# Patient Record
Sex: MALE | Race: BLACK OR AFRICAN AMERICAN | NOT HISPANIC OR LATINO | Employment: FULL TIME | URBAN - METROPOLITAN AREA
[De-identification: names, ages, dates, MRNs, and addresses within clinical notes are randomized per-mention and may not be internally consistent; named-entity substitution may affect disease eponyms.]

---

## 2017-03-28 ENCOUNTER — APPOINTMENT (OUTPATIENT)
Dept: CT IMAGING | Facility: CLINIC | Age: 42
End: 2017-03-28
Attending: EMERGENCY MEDICINE

## 2017-03-28 ENCOUNTER — HOSPITAL ENCOUNTER (EMERGENCY)
Facility: CLINIC | Age: 42
Discharge: HOME | End: 2017-03-28
Attending: EMERGENCY MEDICINE | Admitting: EMERGENCY MEDICINE

## 2017-03-28 VITALS
OXYGEN SATURATION: 98 % | TEMPERATURE: 98.1 F | SYSTOLIC BLOOD PRESSURE: 175 MMHG | DIASTOLIC BLOOD PRESSURE: 127 MMHG | RESPIRATION RATE: 18 BRPM | HEART RATE: 92 BPM

## 2017-03-28 DIAGNOSIS — I15.9 SECONDARY HYPERTENSION: ICD-10-CM

## 2017-03-28 DIAGNOSIS — S13.9XXA SPRAIN OF NECK, INITIAL ENCOUNTER: ICD-10-CM

## 2017-03-28 DIAGNOSIS — S10.93XA NECK CONTUSION: ICD-10-CM

## 2017-03-28 LAB
CREAT BLD-MCNC: 1 MG/DL (ref 0.66–1.25)
GFR SERPL CREATININE-BSD FRML MDRD: 82 ML/MIN/1.7M2

## 2017-03-28 PROCEDURE — 72125 CT NECK SPINE W/O DYE: CPT

## 2017-03-28 PROCEDURE — 25000132 ZZH RX MED GY IP 250 OP 250 PS 637: Performed by: EMERGENCY MEDICINE

## 2017-03-28 PROCEDURE — 82565 ASSAY OF CREATININE: CPT

## 2017-03-28 PROCEDURE — 99285 EMERGENCY DEPT VISIT HI MDM: CPT | Mod: 25

## 2017-03-28 PROCEDURE — 70498 CT ANGIOGRAPHY NECK: CPT

## 2017-03-28 PROCEDURE — 25000128 H RX IP 250 OP 636: Performed by: EMERGENCY MEDICINE

## 2017-03-28 PROCEDURE — 25500064 ZZH RX 255 OP 636: Performed by: EMERGENCY MEDICINE

## 2017-03-28 RX ORDER — HYDROCODONE BITARTRATE AND ACETAMINOPHEN 5; 325 MG/1; MG/1
1 TABLET ORAL ONCE
Status: COMPLETED | OUTPATIENT
Start: 2017-03-28 | End: 2017-03-28

## 2017-03-28 RX ORDER — IBUPROFEN 800 MG/1
800 TABLET, FILM COATED ORAL ONCE
Status: COMPLETED | OUTPATIENT
Start: 2017-03-28 | End: 2017-03-28

## 2017-03-28 RX ORDER — HYDROCODONE BITARTRATE AND ACETAMINOPHEN 5; 325 MG/1; MG/1
1-2 TABLET ORAL EVERY 6 HOURS PRN
Qty: 8 TABLET | Refills: 0 | Status: SHIPPED | OUTPATIENT
Start: 2017-03-28 | End: 2017-04-01

## 2017-03-28 RX ORDER — IBUPROFEN 600 MG/1
600 TABLET, FILM COATED ORAL EVERY 6 HOURS PRN
Qty: 20 TABLET | Refills: 1 | Status: SHIPPED | OUTPATIENT
Start: 2017-03-28

## 2017-03-28 RX ORDER — IOPAMIDOL 755 MG/ML
500 INJECTION, SOLUTION INTRAVASCULAR ONCE
Status: COMPLETED | OUTPATIENT
Start: 2017-03-28 | End: 2017-03-28

## 2017-03-28 RX ORDER — METHOCARBAMOL 750 MG/1
750 TABLET, FILM COATED ORAL 4 TIMES DAILY PRN
Qty: 15 TABLET | Refills: 0 | Status: SHIPPED | OUTPATIENT
Start: 2017-03-28 | End: 2018-09-21

## 2017-03-28 RX ADMIN — SODIUM CHLORIDE 80 ML: 9 INJECTION, SOLUTION INTRAVENOUS at 18:55

## 2017-03-28 RX ADMIN — HYDROCODONE BITARTRATE AND ACETAMINOPHEN 1 TABLET: 5; 325 TABLET ORAL at 19:15

## 2017-03-28 RX ADMIN — IBUPROFEN 800 MG: 800 TABLET, FILM COATED ORAL at 14:13

## 2017-03-28 RX ADMIN — IOPAMIDOL 70 ML: 755 INJECTION, SOLUTION INTRAVENOUS at 18:55

## 2017-03-28 ASSESSMENT — ENCOUNTER SYMPTOMS
BACK PAIN: 1
HEADACHES: 1
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
## 2017-03-28 NOTE — ED AVS SNAPSHOT
[facility] Emergency Department    [address]    [facility] [address]    Phone:  [phone]    Fax:  [phone]                                       Jairo Whittington   MRN: [id number]    Department:  [facility] Emergency Department   Date of Visit:  3/28/2017           After Visit Summary Signature Page     I have received my discharge instructions, and my questions have been answered. I have discussed any challenges I see with this plan with the nurse or doctor.    ..........................................................................................................................................  Patient/Patient Representative Signature      ..........................................................................................................................................  Patient Representative Print Name and Relationship to Patient    ..................................................               ................................................  Date                                            Time    ..........................................................................................................................................  Reviewed by Signature/Title    ...................................................              ..............................................  Date                                                            Time
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
## 2017-03-29 NOTE — DISCHARGE INSTRUCTIONS
Discharge Instructions for High Blood Pressure (Hypertension)  You have been diagnosed with high blood pressure (also called hypertension). This means the force of blood against your artery walls is too strong. It also means your heart is working hard to move blood. High blood pressure usually has no symptoms, but over time, it can damage your heart, blood vessels, eyes, kidneys, and other organs. With help from your doctor, you can manage your blood pressure and protect your health.  Taking medications    Learn to take your own blood pressure. Keep a record of your results. Ask your doctor which readings mean that you need medical attention.    Take your blood pressure medication exactly as directed. Don t skip doses. Missing doses can cause your blood pressure to get out of control.    Avoid medications that contain heart stimulants, including over-the-counter drugs. Check for warnings about high blood pressure on the label.    Check with your doctor before taking a decongestant. Some decongestants can worsen high blood pressure.  Lifestyle changes    Maintain a healthy weight. Get help to lose any extra pounds.    Cut back on salt.    Limit canned, dried, packaged, and fast foods.    Don t add salt to your food at the table.    Season foods with herbs instead of salt when you cook.    Follow the DASH (Dietary Approaches to Stop Hypertension) eating plan. This plan recommends vegetables, fruits, whole gains, and other heart healthy foods.    Begin an exercise program. Ask your doctor how to get started. The American Heart Association recommends aerobic exercise 3 to 4 times a week for an average of 40 minutes at a time, with your doctor's approval. Simple activities like walking or gardening can help.    Break the smoking habit. Enroll in a stop-smoking program to improve your chances of success. Ask your health care provider about programs and medications to help you stop smoking.    Limit drinks that contain  caffeine (coffee, black or green tea, cola) to 2 per day.    Never take stimulants such as amphetamines or cocaine; these drugs can be deadly for someone with high blood pressure.    Control your stress. Learn stress-management techniques.    Limit alcohol to no more than 1 drink a day for women and 2 drinks a day for men.  Follow-up care  Make a follow-up appointment as directed by our staff.     When to seek medical care  Call your doctor immediately if you have any of the following:    Chest pain or shortness of breath (call 911)    Moderate to severe headache    Weakness in the muscles of your face, arms, or legs    Trouble speaking    Extreme drowsiness    Confusion    Fainting or dizziness    Pulsating or rushing sound in your ears    Unexplained nosebleed    Weakness, tingling, or numbness of your face, arms, or legs    Change in vision    Blood pressure measured at home that is greater than 180/110     0190-0693 [employer]. [address] [place] [address], [place] [address]. All rights reserved. This information is not intended as a substitute for professional medical care. Always follow your healthcare professional's instructions.

## 2017-03-31 ENCOUNTER — OFFICE VISIT (OUTPATIENT)
Dept: FAMILY MEDICINE | Facility: CLINIC | Age: 42
End: 2017-03-31
Payer: COMMERCIAL

## 2017-03-31 VITALS
OXYGEN SATURATION: 96 % | HEIGHT: 74 IN | BODY MASS INDEX: 25.6 KG/M2 | TEMPERATURE: 98.3 F | WEIGHT: 199.5 LBS | DIASTOLIC BLOOD PRESSURE: 98 MMHG | SYSTOLIC BLOOD PRESSURE: 148 MMHG | HEART RATE: 76 BPM | RESPIRATION RATE: 16 BRPM

## 2017-03-31 DIAGNOSIS — I10 ESSENTIAL HYPERTENSION: Primary | ICD-10-CM

## 2017-03-31 PROCEDURE — 99213 OFFICE O/P EST LOW 20 MIN: CPT | Performed by: PHYSICIAN ASSISTANT

## 2017-03-31 RX ORDER — CLONIDINE HYDROCHLORIDE 0.1 MG/1
0.1 TABLET ORAL 2 TIMES DAILY
Qty: 60 TABLET | Refills: 0 | Status: SHIPPED | OUTPATIENT
Start: 2017-03-31 | End: 2017-04-20 | Stop reason: SINTOL

## 2017-03-31 NOTE — PATIENT INSTRUCTIONS
1. Start the clonidine 0.1mg twice daily as instructed  2. Return for nurse visit to check your blood pressure in 2 weeks.   3. Return for visit to a provider in one month.

## 2017-03-31 NOTE — MR AVS SNAPSHOT
"              After Visit Summary   3/31/2017    Jairo Whittington    MRN: [id number]           Patient Information     Date Of Birth          1975        Visit Information        Provider Department      3/31/2017 10:30 AM Siegler, Nicole Joy, PA-C [facility]        Today's Diagnoses     Essential hypertension    -  1      Care Instructions    1. Start the clonidine 0.1mg twice daily as instructed  2. Return for nurse visit to check your blood pressure in 2 weeks.   3. Return for visit to a provider in one month.         Follow-ups after your visit        Who to contact     If you have questions or need follow up information about today's clinic visit or your schedule please contact [facility] directly at [phone].  Normal or non-critical lab and imaging results will be communicated to you by MyChart, letter or phone within 4 business days after the clinic has received the results. If you do not hear from us within 7 days, please contact the clinic through MyChart or phone. If you have a critical or abnormal lab result, we will notify you by phone as soon as possible.  Submit refill requests through [employer] or call your pharmacy and they will forward the refill request to us. Please allow 3 business days for your refill to be completed.          Additional Information About Your Visit        MyChart Information     [employer] lets you send messages to your doctor, view your test results, renew your prescriptions, schedule appointments and more. To sign up, go to [email or web address] . Click on \"Log in\" on the left side of the screen, which will take you to the Welcome page. Then click on \"Sign up Now\" on the right side of the page.     You will be asked to enter the access code listed below, as well as some personal information. Please follow the directions to create your username and password.     Your access code is: [email or web address]  Expires: 6/26/2017  " "8:06 PM     Your access code will  in 90 days. If you need help or a new code, please call your [facility] or [phone].        Care EveryWhere ID     This is your Care EveryWhere ID. This could be used by other organizations to access your [place] medical records  [id number]        Your Vitals Were     Pulse Temperature Respirations Height Pulse Oximetry BMI (Body Mass Index)    76 98.3  F (36.8  C) (Tympanic) 16 6' 2\" (1.88 m) 96% 25.61 kg/m2       Blood Pressure from Last 3 Encounters:   17 (!) 148/98   17 (!) 175/127   10/25/16 (!) 150/130    Weight from Last 3 Encounters:   17 199 lb 8 oz (90.5 kg)   10/25/16 202 lb 8 oz (91.9 kg)   10/01/15 191 lb 9 oz (86.9 kg)              Today, you had the following     No orders found for display         Today's Medication Changes          These changes are accurate as of: 3/31/17 10:49 AM.  If you have any questions, ask your nurse or doctor.               Start taking these medicines.        Dose/Directions    cloNIDine 0.1 MG tablet   Commonly known as:  CATAPRES   Used for:  Essential hypertension   Started by:  Siegler, Nicole Joy, PA-C        Dose:  0.1 mg   Take 1 tablet (0.1 mg) by mouth 2 times daily   Quantity:  60 tablet   Refills:  0            Where to get your medicines      These medications were sent to [facility] [id number] [place] [address] [address] AT [facility] [address] [id number], [facility] [address]    Hours:  24-hours Phone:  [phone]     cloNIDine 0.1 MG tablet                Primary Care Provider Office Phone #    [facility] [phone] [address] [address]        Thank you!     Thank you for choosing [facility]  for your care. Our goal is always to provide you with excellent care. Hearing back from our patients is one way we can continue to improve our services. Please take a few minutes to complete the " written survey that you may receive in the mail after your visit with us. Thank you!             Your Updated Medication List - Protect others around you: Learn how to safely use, store and throw away your medicines at www.disposemymeds.org.          This list is accurate as of: 3/31/17 10:49 AM.  Always use your most recent med list.                   Brand Name Dispense Instructions for use    cloNIDine 0.1 MG tablet    CATAPRES    60 tablet    Take 1 tablet (0.1 mg) by mouth 2 times daily       HYDROcodone-acetaminophen 5-325 MG per tablet    NORCO    8 tablet    Take 1-2 tablets by mouth every 6 hours as needed for moderate to severe pain       ibuprofen 600 MG tablet    ADVIL/MOTRIN    20 tablet    Take 1 tablet (600 mg) by mouth every 6 hours as needed for moderate pain       methocarbamol 750 MG tablet    ROBAXIN    15 tablet    Take 1 tablet (750 mg) by mouth 4 times daily as needed for muscle spasms       triamterene-hydrochlorothiazide 37.5-25 MG per capsule    DYAZIDE    30 capsule    Take 1 capsule by mouth daily       TYLENOL PO      Take by mouth as needed for mild pain or fever

## 2017-03-31 NOTE — NURSING NOTE
"Chief Complaint   Patient presents with     Hypertension     Elevated B/P        Initial BP (!) 158/100 (BP Location: Left arm, Patient Position: Chair, Cuff Size: Adult Regular)  Pulse 76  Temp 98.3  F (36.8  C) (Tympanic)  Resp 16  Ht 6' 2\" (1.88 m)  Wt 199 lb 8 oz (90.5 kg)  SpO2 96%  BMI 25.61 kg/m2 Estimated body mass index is 25.61 kg/(m^2) as calculated from the following:    Height as of this encounter: 6' 2\" (1.88 m).    Weight as of this encounter: 199 lb 8 oz (90.5 kg).  Medication Reconciliation: complete     Christina Carlos LPN  "

## 2017-03-31 NOTE — PROGRESS NOTES
SUBJECTIVE:                                                    Jairo Whittington is a 41 year old male who presents to clinic today for the following health issues:    Hypertension Follow-up      Outpatient blood pressures are being checked at store.  Results are Elevated.    Low Salt Diet: no added salt       Amount of exercise or physical activity: 4-5 days/week for an average of 15-30 minutes    Problems taking medications regularly: No    Medication side effects: none    Diet: low salt    He has been following an appropriate diet but has had persisting elevated blood pressure results over the past few months. He has been taking his dyazide as directed. He has tried amlodipine and had headaches, has taken clonidine without side effects.     Problem list and histories reviewed & adjusted, as indicated.  Additional history: as documented    Patient Active Problem List   Diagnosis     GERD (gastroesophageal reflux disease)     HTN (hypertension)     Hyperlipidemia with target LDL less than 130     Folliculitis     Past Surgical History:   Procedure Laterality Date     C NONSPECIFIC PROCEDURE      left foot surgery       Social History   Substance Use Topics     Smoking status: Never Smoker     Smokeless tobacco: Never Used     Alcohol use No     Family History   Problem Relation Age of Onset     Family History Negative Mother      Family History Negative Father          Current Outpatient Prescriptions   Medication Sig Dispense Refill     cloNIDine (CATAPRES) 0.1 MG tablet Take 1 tablet (0.1 mg) by mouth 2 times daily 60 tablet 0     HYDROcodone-acetaminophen (NORCO) 5-325 MG per tablet Take 1-2 tablets by mouth every 6 hours as needed for moderate to severe pain 8 tablet 0     methocarbamol (ROBAXIN) 750 MG tablet Take 1 tablet (750 mg) by mouth 4 times daily as needed for muscle spasms 15 tablet 0     ibuprofen (ADVIL/MOTRIN) 600 MG tablet Take 1 tablet (600 mg) by mouth every 6 hours as needed for moderate pain 20  "tablet 1     triamterene-hydrochlorothiazide (DYAZIDE) 37.5-25 MG per capsule Take 1 capsule by mouth daily 30 capsule 0     Acetaminophen (TYLENOL PO) Take by mouth as needed for mild pain or fever       [DISCONTINUED] cloNIDine (CATAPRES) 0.1 MG tablet 1 po in clinic (Patient not taking: Reported on 3/31/2017) 1 tablet 0       Reviewed and updated as needed this visit by clinical staff  Tobacco  Allergies  Meds  Problems  Med Hx  Surg Hx  Fam Hx  Soc Hx        Reviewed and updated as needed this visit by Provider  Allergies  Meds  Problems         ROS:  Constitutional, HEENT, cardiovascular, pulmonary, gi and gu systems are negative, except as otherwise noted.    OBJECTIVE:                                                    BP (!) 148/98  Pulse 76  Temp 98.3  F (36.8  C) (Tympanic)  Resp 16  Ht 6' 2\" (1.88 m)  Wt 199 lb 8 oz (90.5 kg)  SpO2 96%  BMI 25.61 kg/m2  Body mass index is 25.61 kg/(m^2).  GENERAL: healthy, alert and no distress  NECK: no adenopathy, no asymmetry, masses, or scars and thyroid normal to palpation  RESP: lungs clear to auscultation - no rales, rhonchi or wheezes  CV: regular rate and rhythm, normal S1 S2, no S3 or S4, no murmur, click or rub, no peripheral edema and peripheral pulses strong  SKIN: no suspicious lesions or rashes  NEURO: Normal strength and tone, mentation intact and speech normal  PSYCH: mentation appears normal, affect normal/bright    Diagnostic Test Results:  none      ASSESSMENT/PLAN:                                                        ICD-10-CM    1. Essential hypertension I10 cloNIDine (CATAPRES) 0.1 MG tablet     We will start him on clonidine today. We will watch closely for low pulse and blood pressure dropping too low with this medication. We decided to use this as he has tolerated it in the past and he was not interested in increasing his dyazide and have increased potential for hyperkalemia. We discussed potential side effects to clonidine and " he was willing to accept that risk with this medication. He agrees to the plan below for close monitoring and we will consider a beta blocker such as metoprolol should he not tolerate this well, long term.     Patient Instructions   1. Start the clonidine 0.1mg twice daily as instructed  2. Return for nurse visit to check your blood pressure in 2 weeks.   3. Return for visit to a provider in one month.       Nicole Joy Siegler, PA-C  [facility]

## 2017-05-03 ENCOUNTER — OFFICE VISIT (OUTPATIENT)
Dept: INTERNAL MEDICINE | Facility: CLINIC | Age: 42
End: 2017-05-03
Payer: COMMERCIAL

## 2017-05-03 VITALS
HEART RATE: 75 BPM | DIASTOLIC BLOOD PRESSURE: 118 MMHG | OXYGEN SATURATION: 98 % | SYSTOLIC BLOOD PRESSURE: 160 MMHG | WEIGHT: 198 LBS | BODY MASS INDEX: 25.42 KG/M2 | TEMPERATURE: 98.8 F

## 2017-05-03 DIAGNOSIS — E78.5 HYPERLIPIDEMIA WITH TARGET LDL LESS THAN 130: ICD-10-CM

## 2017-05-03 DIAGNOSIS — I10 BENIGN ESSENTIAL HYPERTENSION: Primary | ICD-10-CM

## 2017-05-03 PROCEDURE — 99214 OFFICE O/P EST MOD 30 MIN: CPT | Performed by: INTERNAL MEDICINE

## 2017-05-03 RX ORDER — LISINOPRIL AND HYDROCHLOROTHIAZIDE 20; 25 MG/1; MG/1
TABLET ORAL
Qty: 30 TABLET | Refills: 0 | Status: SHIPPED | OUTPATIENT
Start: 2017-05-03

## 2017-05-03 NOTE — MR AVS SNAPSHOT
"              After Visit Summary   5/3/2017    Jairo Whittington    MRN: [id number]           Patient Information     Date Of Birth          1975        Visit Information        Provider Department      5/3/2017 8:40 AM Don Leal MD [facility]        Today's Diagnoses     Benign essential hypertension    -  1    Hyperlipidemia with target LDL less than 130          Care Instructions    *  Blood pressure elevated    *  Start Medication to help lower blood pressure.  You have taken these medication before in 4014-3023.     *  Start Lisinopril HCTZ 20/25.  Take 1/2 tablet per day in the morning for one week, then 1 tablet per day in the morning.     *  Main side effects are throat irritation, new coughing (typically dry cough).  Very rare reaction can include swelling of the face, lipids, tongue.  If you experience any concerning side effects, then stop the medication immediately and contact us.     *  Return to see me in approximately 1 month regardless of how you feel, sooner if needed.  Please get fasting labs done in the [facility] lab 1-2 days before this appointment.  Eat nothing for at least 8 hours prior to having these labs drawn.  Call [phone] to schedule both appointments.       5 GOALS TO PREVENT VASCULAR DISEASE:     1.  Aggressive blood pressure control, under 130/80 ideally.  Using medications if needed.    Your blood pressure is NOT under good control    BP Readings from Last 4 Encounters:   05/03/17 (!) 160/118   03/31/17 (!) 148/98   03/28/17 (!) 175/127   10/25/16 (!) 150/130       2.  Aggressive LDL cholesterol (\"bad cholesterol\") lowering as indicated.    Your goal is an LDL under 130 for sure, preferably under 100.  (If you have diabetes or previous vascular disease, the the LDL goals would be under 100 for sure, preferably under 70.)    New guidelines identify four high-risk groups who could benefit from statins:   *people with pre-existing heart disease, " such as those who have had a heart attack;   *people ages 40 to 75 who have diabetes of any type  *patients ages 40 to 75 with at least a 7.5% risk of developing cardiovascular disease over the next decade, according to a formula described in the guidelines  *patients with the sort of super-high cholesterol that sometimes runs in families, as evidenced by an LDL of 190 milligrams per deciliter or higher    Your cholesterol levels are mildly elevated, we will recheck them again.     Recent Labs   Lab Test  09/19/12   1337  06/14/11   1144   CHOL  222*  221*   HDL  43  44   LDL  150*  155*   TRIG  145  108   CHOLHDLRATIO  5.2*  5.0       3.  Aggressive diabetic prevention, screening and/or management.      You do not have diabetes as of the most recent blood tests.     4.  No smoking    5.  Consider taking low dose aspirin (81 mg) tablet once per day OVER AGE 50            Follow-ups after your visit        Follow-up notes from your care team     Return in about 4 weeks (around 5/31/2017) for BP Recheck.      Future tests that were ordered for you today     Open Future Orders        Priority Expected Expires Ordered    CBC with platelets and differential Routine 6/3/2017 7/3/2017 5/3/2017    Basic metabolic panel Routine 6/3/2017 7/3/2017 5/3/2017    AST Routine 6/3/2017 7/3/2017 5/3/2017    ALT Routine 6/3/2017 7/3/2017 5/3/2017    Lipid Profile with reflex to direct LDL Routine 6/3/2017 7/3/2017 5/3/2017            Who to contact     If you have questions or need follow up information about today's clinic visit or your schedule please contact [facility] directly at [phone].  Normal or non-critical lab and imaging results will be communicated to you by MyChart, letter or phone within 4 business days after the clinic has received the results. If you do not hear from us within 7 days, please contact the clinic through MyChart or phone. If you have a critical or abnormal lab result, we will  "notify you by phone as soon as possible.  Submit refill requests through [employer] or call your pharmacy and they will forward the refill request to us. Please allow 3 business days for your refill to be completed.          Additional Information About Your Visit        [employer] Information     [employer] lets you send messages to your doctor, view your test results, renew your prescriptions, schedule appointments and more. To sign up, go to [email or web address] . Click on \"Log in\" on the left side of the screen, which will take you to the Welcome page. Then click on \"Sign up Now\" on the right side of the page.     You will be asked to enter the access code listed below, as well as some personal information. Please follow the directions to create your username and password.     Your access code is: [email or web address]  Expires: 2017  8:06 PM     Your access code will  in 90 days. If you need help or a new code, please call your [place] clinic or [phone].        Care EveryWhere ID     This is your Care EveryWhere ID. This could be used by other organizations to access your [place] medical records  [id number]        Your Vitals Were     Pulse Temperature Pulse Oximetry BMI (Body Mass Index)          75 98.8  F (37.1  C) (Oral) 98% 25.42 kg/m2         Blood Pressure from Last 3 Encounters:   17 (!) 160/118   17 (!) 148/98   17 (!) 175/127    Weight from Last 3 Encounters:   17 198 lb (89.8 kg)   17 199 lb 8 oz (90.5 kg)   10/25/16 202 lb 8 oz (91.9 kg)                 Today's Medication Changes          These changes are accurate as of: 5/3/17  9:34 AM.  If you have any questions, ask your nurse or doctor.               Start taking these medicines.        Dose/Directions    lisinopril-hydrochlorothiazide 20-25 MG per tablet   Commonly known as:  PRINZIDE/ZESTORETIC   Used for:  Benign essential hypertension   Started by:  Don Leal MD        1/2 TABLET PER DAY IN " THE MORNING FOR ONE WEEK, THE 1 TABLET PER DAY IN THE MORNING   Quantity:  30 tablet   Refills:  0         Stop taking these medicines if you haven't already. Please contact your care team if you have questions.     cloNIDine 0.1 MG tablet   Commonly known as:  CATAPRES   Stopped by:  Don Leal MD                Where to get your medicines      These medications were sent to [address] [address]     Phone:  [phone]     lisinopril-hydrochlorothiazide 20-25 MG per tablet                Primary Care Provider Office Phone #    [facility] [phone] [address] [address]        Thank you!     Thank you for choosing [facility]  for your care. Our goal is always to provide you with excellent care. Hearing back from our patients is one way we can continue to improve our services. Please take a few minutes to complete the written survey that you may receive in the mail after your visit with us. Thank you!             Your Updated Medication List - Protect others around you: Learn how to safely use, store and throw away your medicines at www.disposemymeds.org.          This list is accurate as of: 5/3/17  9:34 AM.  Always use your most recent med list.                   Brand Name Dispense Instructions for use    ibuprofen 600 MG tablet    ADVIL/MOTRIN    20 tablet    Take 1 tablet (600 mg) by mouth every 6 hours as needed for moderate pain       lisinopril-hydrochlorothiazide 20-25 MG per tablet    PRINZIDE/ZESTORETIC    30 tablet    1/2 TABLET PER DAY IN THE MORNING FOR ONE WEEK, THE 1 TABLET PER DAY IN THE MORNING       methocarbamol 750 MG tablet    ROBAXIN    15 tablet    Take 1 tablet (750 mg) by mouth 4 times daily as needed for muscle spasms       triamterene-hydrochlorothiazide 37.5-25 MG per capsule    DYAZIDE    30 capsule    Take 1 capsule by mouth  daily       TYLENOL PO      Take by mouth as needed for mild pain or fever Reported on 5/3/2017

## 2017-05-03 NOTE — PROGRESS NOTES
SUBJECTIVE:                                                    Jairo Whittington is a 41 year old male who presents to clinic today for the following health issues:      Hypertension Follow-up      Outpatient blood pressures are being checked at store.  Results are 140-150/.     Pt states he drinks 4-5 cups of coffee/tea throughout the day. He stopped clonidine after one week (approx 4/7/17) due to headaches and dry mouth side effect. He has been out of dyazide for 1 week.    Low Salt Diet: no added salt       Amount of exercise or physical activity: 6-7 days/week for an average of 45-60 minutes    Problems taking medications regularly: Yes,  side effects    Medication side effects: headaches and dry mouth from clonidine    Diet: low salt          Problem list and histories reviewed & adjusted, as indicated.  Additional history: as documented        Reviewed and updated as needed this visit by clinical staff  Tobacco  Allergies       Reviewed and updated as needed this visit by Provider             Past Medical History:  ---------------------------  Past Medical History:   Diagnosis Date     Dermatitis      Folliculitis  June 2011    scalp     HTN (hypertension)      Hyperlipidemia LDL goal < 130        Past Surgical History:  ---------------------------  Past Surgical History:   Procedure Laterality Date     C NONSPECIFIC PROCEDURE      left foot surgery       Current Medications:  ---------------------------  Current Outpatient Prescriptions   Medication Sig Dispense Refill     lisinopril-hydrochlorothiazide (PRINZIDE/ZESTORETIC) 20-25 MG per tablet 1/2 TABLET PER DAY IN THE MORNING FOR ONE WEEK, THE 1 TABLET PER DAY IN THE MORNING 30 tablet 0     methocarbamol (ROBAXIN) 750 MG tablet Take 1 tablet (750 mg) by mouth 4 times daily as needed for muscle spasms (Patient not taking: Reported on 5/3/2017) 15 tablet 0     ibuprofen (ADVIL/MOTRIN) 600 MG tablet Take 1 tablet (600 mg) by mouth every 6 hours as needed  for moderate pain (Patient not taking: Reported on 5/3/2017) 20 tablet 1     Acetaminophen (TYLENOL PO) Take by mouth as needed for mild pain or fever Reported on 5/3/2017       triamterene-hydrochlorothiazide (DYAZIDE) 37.5-25 MG per capsule Take 1 capsule by mouth daily (Patient not taking: Reported on 5/3/2017) 30 capsule 0       Allergies:  -------------  Allergies   Allergen Reactions     Amlodipine Besylate      headaches       Social History:  -------------------  Social History     Social History     Marital status:      Spouse name: N/A     Number of children: 2     Years of education: N/A     Occupational History      [email or web address]     Social History Main Topics     Smoking status: Never Smoker     Smokeless tobacco: Never Used     Alcohol use No     Drug use: No     Sexual activity: Yes     Partners: Female     Other Topics Concern     Not on file     Social History Narrative       Family Medical History:  ------------------------------  Family History   Problem Relation Age of Onset     Family History Negative Mother      Family History Negative Father          ROS:  REVIEW OF SYSTEMS:    RESP: negative for cough, dyspnea, wheezing, hemoptysis  CV: negative for chest pain, palpitations, PND, PEREZ, orthopnea; reports no changes in their ability to perform physical activity (from cardiovascular standpoint)  GI: negative for dysphagia, N/V, pain, melena, diarrhea and constipation  NEURO: negative for numbness/tingling, paralysis, incoordination, or focal weakness     OBJECTIVE:                                                    BP (!) 160/118  Pulse 75  Temp 98.8  F (37.1  C) (Oral)  Wt 198 lb (89.8 kg)  SpO2 98%  BMI 25.42 kg/m2     GENERAL alert and no distress  EYES:  Normal sclera,conjunctiva, EOMI  HENT: oral and posterior pharynx without lesions or erythema, facies symmetric  NECK: Neck supple. No LAD, without thyroidmegaly or JVD., Carotids without bruits.  RESP:  Clear to ausculation bilaterally without wheezes or crackles. Normal BS in all fields.  CV: RRR normal S1S2 without murmurs, rubs or gallops. PMI normal  LYMPH: no cervical lymph adenopathy appreciated  MS: extremities- no gross deformities of the visible extremities noted, no edema  PSYCH: Alert and oriented times 3; speech- coherent  SKIN:  No obvious significant skin lesions on visible portions of face          ASSESSMENT/PLAN:                                                      (I10) Benign essential hypertension  (primary encounter diagnosis)  Comment: needs treatment  Discussed hypertension in detail including JNC VIII guidelines for blood pressure goals.  Discussed indication for treatment and treatment options.  Discussed the importance for aggressive management of HTN to prevent vascular complications later.  Recommended lower fat, lower carbohydrate, and lower sodium (<2000 mg)diet.  Discussed required intervals for follow up on HTN, lab studies, and the need to aggresive management of other cardiac disease risk factors.  Recommened pt. follow their blood pressures outside the clinic to ensure that BPs are remaining within guidelines, and to contact me if the readings are not within guidelines so we can adjust treatment as needed.   Plan: lisinopril-hydrochlorothiazide         (PRINZIDE/ZESTORETIC) 20-25 MG per tablet, CBC         with platelets and differential, Basic         metabolic panel            (E78.5) Hyperlipidemia with target LDL less than 130  Comment: Discussed current lipid results, previous results (if available) current guidelines (NCEP) for treatment and goals for lipids.  Discussed lifestyle modification, dietary changes (low fat, low simple carb) and regular aerobic exercise.  Discussed the link between dysmetabolic syndrome and impaired glucose tolerance seen in certain patterns of lipids.  Briefly discussed medication used for lipid lowering, including the statins are their possible  side effects of myalgias, rhabdomyolysis, and liver toxicity.   Plan: AST, ALT, Lipid Profile with reflex to direct         LDL            *  Blood pressure elevated    *  Start Medication to help lower blood pressure.  You have taken these medication before in 4398-4373.     *  Start Lisinopril HCTZ 20/25.  Take 1/2 tablet per day in the morning for one week, then 1 tablet per day in the morning.     *  Main side effects are throat irritation, new coughing (typically dry cough).  Very rare reaction can include swelling of the face, lipids, tongue.  If you experience any concerning side effects, then stop the medication immediately and contact us.     *  Return to see me in approximately 1 month regardless of how you feel, sooner if needed.  Please get fasting labs done in the [place] lab 1-2 days before this appointment.  Eat nothing for at least 8 hours prior to having these labs drawn.  Call [phone] to schedule both appointments.         See Patient Instructions    ALONZO JONES M.D., MD  [facility]

## 2017-05-03 NOTE — NURSING NOTE
"Chief Complaint   Patient presents with     Hypertension     med recheck - side effect from clonidine       Initial BP (!) 158/116  Pulse 75  Temp 98.8  F (37.1  C) (Oral)  Wt 198 lb (89.8 kg)  SpO2 98%  BMI 25.42 kg/m2 Estimated body mass index is 25.42 kg/(m^2) as calculated from the following:    Height as of 3/31/17: 6' 2\" (1.88 m).    Weight as of this encounter: 198 lb (89.8 kg).  Medication Reconciliation: complete   Ekta German CMA      "

## 2017-05-03 NOTE — PATIENT INSTRUCTIONS
"*  Blood pressure elevated    *  Start Medication to help lower blood pressure.  You have taken these medication before in 9941-5296.     *  Start Lisinopril HCTZ 20/25.  Take 1/2 tablet per day in the morning for one week, then 1 tablet per day in the morning.     *  Main side effects are throat irritation, new coughing (typically dry cough).  Very rare reaction can include swelling of the face, lipids, tongue.  If you experience any concerning side effects, then stop the medication immediately and contact us.     *  Return to see me in approximately 1 month regardless of how you feel, sooner if needed.  Please get fasting labs done in the [facility] lab 1-2 days before this appointment.  Eat nothing for at least 8 hours prior to having these labs drawn.  Call [phone] to schedule both appointments.       5 GOALS TO PREVENT VASCULAR DISEASE:     1.  Aggressive blood pressure control, under 130/80 ideally.  Using medications if needed.    Your blood pressure is NOT under good control    BP Readings from Last 4 Encounters:   05/03/17 (!) 160/118   03/31/17 (!) 148/98   03/28/17 (!) 175/127   10/25/16 (!) 150/130       2.  Aggressive LDL cholesterol (\"bad cholesterol\") lowering as indicated.    Your goal is an LDL under 130 for sure, preferably under 100.  (If you have diabetes or previous vascular disease, the the LDL goals would be under 100 for sure, preferably under 70.)    New guidelines identify four high-risk groups who could benefit from statins:   *people with pre-existing heart disease, such as those who have had a heart attack;   *people ages 40 to 75 who have diabetes of any type  *patients ages 40 to 75 with at least a 7.5% risk of developing cardiovascular disease over the next decade, according to a formula described in the guidelines  *patients with the sort of super-high cholesterol that sometimes runs in families, as evidenced by an LDL of 190 milligrams per deciliter or higher    Your cholesterol " levels are mildly elevated, we will recheck them again.     Recent Labs   Lab Test  09/19/12   1337  06/14/11   1144   CHOL  222*  221*   HDL  43  44   LDL  150*  155*   TRIG  145  108   CHOLHDLRATIO  5.2*  5.0       3.  Aggressive diabetic prevention, screening and/or management.      You do not have diabetes as of the most recent blood tests.     4.  No smoking    5.  Consider taking low dose aspirin (81 mg) tablet once per day OVER AGE 50

## 2018-09-14 ENCOUNTER — HOSPITAL ENCOUNTER (EMERGENCY)
Facility: CLINIC | Age: 43
Discharge: HOME | End: 2018-09-14
Attending: NURSE PRACTITIONER | Admitting: NURSE PRACTITIONER
Payer: COMMERCIAL

## 2018-09-14 VITALS
SYSTOLIC BLOOD PRESSURE: 175 MMHG | OXYGEN SATURATION: 98 % | TEMPERATURE: 98.5 F | DIASTOLIC BLOOD PRESSURE: 129 MMHG | RESPIRATION RATE: 18 BRPM | HEART RATE: 67 BPM

## 2018-09-14 DIAGNOSIS — R03.0 ELEVATED BLOOD PRESSURE READING WITHOUT DIAGNOSIS OF HYPERTENSION: ICD-10-CM

## 2018-09-14 DIAGNOSIS — M54.2 NECK PAIN: ICD-10-CM

## 2018-09-14 DIAGNOSIS — M25.572 PAIN IN JOINT INVOLVING ANKLE AND FOOT, LEFT: ICD-10-CM

## 2018-09-14 PROCEDURE — 99282 EMERGENCY DEPT VISIT SF MDM: CPT

## 2018-09-14 ASSESSMENT — ENCOUNTER SYMPTOMS
NECK PAIN: 1
ARTHRALGIAS: 1
WEAKNESS: 0
SHORTNESS OF BREATH: 0
FEVER: 0
NUMBNESS: 0

## 2018-09-14 NOTE — ED AVS SNAPSHOT
[facility] Emergency Department    [address]    [facility] [address]    Phone:  [phone]    Fax:  [phone]                                       Jairo Whittington   MRN: [id number]    Department:  [facility] Emergency Department   Date of Visit:  9/14/2018           After Visit Summary Signature Page     I have received my discharge instructions, and my questions have been answered. I have discussed any challenges I see with this plan with the nurse or doctor.    ..........................................................................................................................................  Patient/Patient Representative Signature      ..........................................................................................................................................  Patient Representative Print Name and Relationship to Patient    ..................................................               ................................................  Date                                   Time    ..........................................................................................................................................  Reviewed by Signature/Title    ...................................................              ..............................................  Date                                               Time          22EPIC Rev 08/18

## 2018-09-14 NOTE — ED AVS SNAPSHOT
[facility] Emergency Department    [address]    [facility] [address]    Phone:  [phone]    Fax:  [phone]                                       Jairo Whittington   MRN: [id number]    Department:  [facility] Emergency Department   Date of Visit:  9/14/2018           Patient Information     Date Of Birth          1975        Your diagnoses for this visit were:     Neck pain     Pain in joint involving ankle and foot, left     Elevated blood pressure reading without diagnosis of hypertension        You were seen by Tashi Rosado APRN CNP.      Follow-up Information     Call Clinic, [facility].    Why:  on Monday to schedule appointment for follow up    Contact information:    [address] [address]  [phone]          Discharge Instructions       Ibuprofen or Naproxen and/or Tylenol scheduled for the next 3-5 days. 600 mg (three tabs) ibuprofen, 440 mg (two tabs) Naproxen, 650-1000 mg of Tylenol.  Ibuprofen and Tylenol are every 6 hours Naproxen is 2 times daily. Follow directions.   Ice or heat to area.  I recommend ice in the first 24-48 hours.    Asymptomatic elevated BP today. Keep log of BP 2-3 times daily for the next week and take those readings to your primary MD.        Discharge Instructions  Hypertension - High Blood Pressure    During you visit to the Emergency Department, your blood pressure was higher than the recommended blood pressure.  This may be related to stress, pain, medication or other temporary conditions. In these cases, your blood pressure may return to normal on its own. If you have a history of high blood pressure, you may need to have your provider adjust your medications. Sometimes, your high measurement here may indicate that you have developed high blood pressure that will stay high unless it is treated. As a general rule, high blood pressure causes problems over years rather than days, weeks, or months.  So, while it is important to treat blood pressure, it is rarely important to treat blood pressure immediately. Occasionally we will begin a medication in the Emergency Department; more often we will recommend close follow-up for medications with a primary doctor/clinic.    Generally, every Emergency Department visit should have a follow-up clinic visit with either a primary or a specialty clinic/provider. Please follow-up as instructed by your emergency provider today.    Return to the Emergency Department if you start to have:    A severe headache.    Chest pain.    Shortness of breath.    Weakness or numbness that affects one part of the body.    Confusion.    Vision changes.    Significant swelling of legs and/or eyes.    A reaction to any medication started in the Emergency Department.    What can I do to help myself?    Avoid alcohol.    Take any blood pressure medicine that you are prescribed.    Get a good night s sleep.    Lower your salt intake.    Exercise.    Lose weight.    Manage stress.    See your doctor regularly    If blood pressure medication was started in the Emergency Department:    The medicine may not have an immediate effect. The body and brain determine what blood pressure you have. The medicine s job is to retrain the body s  thermostat  to a lower blood pressure.    You will need to follow up with your provider to see how this medicine is working for you.  If you were given a prescription for medicine here today, be sure to read all of the information (including the package insert) that comes with your prescription.  This will include important information about the medicine, its side effects, and any warnings that you need to know about.  The pharmacist who fills the prescription can provide more information and answer questions you may have about the medicine.  If you have questions or concerns that the pharmacist cannot address, please call or return to the Emergency Department.    Remember that you can always come back to the Emergency Department if you are not able to see your regular provider in the amount of time listed above, if you get any new symptoms, or if there is anything that worries you.    Discharge References/Attachments     R.I.C.E. (ENGLISH)    STRAINS AND SPRAINS, SELF-CARE FOR (ENGLISH)    NECK PAIN (ENGLISH)    MUSCLE STRAIN, EXTREMITY (ENGLISH)      24 Hour Appointment Hotline       To make an appointment at any [facility], call [phone] ([phone]). If you don't have a family doctor or clinic, we will help you find one. [place] clinics are conveniently located to serve the needs of you and your family.             Review of your medicines      Our records show that you are taking the medicines listed below. If these are incorrect, please call your family doctor or clinic.        Dose / Directions Last dose taken    ibuprofen 600 MG tablet   Commonly known as:  ADVIL/MOTRIN   Dose:  600 mg   Quantity:  20 tablet        Take 1 tablet (600 mg) by mouth every 6 hours as needed for moderate pain   Refills:  1        lisinopril-hydrochlorothiazide 20-25 MG per tablet   Commonly known as:  PRINZIDE/ZESTORETIC   Quantity:  30 tablet        1/2 TABLET PER DAY IN THE MORNING FOR ONE WEEK, THE 1 TABLET PER DAY IN THE MORNING   Refills:  0        methocarbamol 750 MG tablet   Commonly known as:  ROBAXIN   Dose:  750 mg   Quantity:  15 tablet        Take 1 tablet (750 mg) by mouth 4 times daily as needed for muscle spasms   Refills:  0        triamterene-hydrochlorothiazide 37.5-25 MG per capsule   Commonly known as:  DYAZIDE   Dose:  1 capsule   Quantity:  30 capsule        Take 1 capsule by mouth daily   Refills:  0        TYLENOL PO        Take by mouth as needed for mild pain or fever Reported on 5/3/2017   Refills:  0                Orders Needing Specimen Collection     None      Pending Results     No orders found from 9/12/2018 to 9/15/2018.             Pending Culture Results     No orders found from 9/12/2018 to 9/15/2018.            Pending Results Instructions     If you had any lab results that were not finalized at the time of your Discharge, you can call the ED Lab Result RN at [phone]. You will be contacted by this team for any positive Lab results or changes in treatment. The nurses are available 7 days a week from 10A to 6:30P.  You can leave a message 24 hours per day and they will return your call.        Test Results From Your Hospital Stay               Clinical Quality Measure: Blood Pressure Screening     Your blood pressure was checked while you were in the emergency department today. The last reading we obtained was  BP: (!) 175/129 . Please read the guidelines below about what these numbers mean and what you should do about them.  If your systolic blood pressure (the top number) is less than 120 and your diastolic blood pressure (the bottom number) is less than 80, then your blood pressure is normal. There is nothing more that you need to do about it.  If your systolic blood pressure (the top number) is 120-139 or your diastolic blood pressure (the bottom number) is 80-89, your blood pressure may be higher than it should be. You should have your blood pressure rechecked within a year by a primary care provider.  If your systolic blood pressure (the top number) is 140 or greater or your diastolic blood pressure (the bottom number) is 90 or greater, you may have high blood pressure. High blood pressure is treatable, but if left untreated over time it can put you at risk for heart attack, stroke, or kidney failure. You should have your blood pressure rechecked by a primary care provider within the next 4 weeks.  If your provider in the emergency department today gave you specific instructions to follow-up with your doctor or provider even sooner than that, you should follow that instruction and not wait for up to 4 weeks for your follow-up  "visit.        Thank you for choosing [facility]       Thank you for choosing [facility] for your care. Our goal is always to provide you with excellent care. Hearing back from our patients is one way we can continue to improve our services. Please take a few minutes to complete the written survey that you may receive in the mail after you visit with us. Thank you!        [employer] Information     [employer] lets you send messages to your doctor, view your test results, renew your prescriptions, schedule appointments and more. To sign up, go to [email or web address] . Click on \"Log in\" on the left side of the screen, which will take you to the Welcome page. Then click on \"Sign up Now\" on the right side of the page.     You will be asked to enter the access code listed below, as well as some personal information. Please follow the directions to create your username and password.     Your access code is: [email or web address]  Expires: 2018 11:15 PM     Your access code will  in 90 days. If you need help or a new code, please call your [facility] clinic or [phone].        Care EveryWhere ID     This is your Care EveryWhere ID. This could be used by other organizations to access your [facility] medical records  [id number]        Equal Access to Services     NICOLE FU : Hadbaylee kowalskio Sovinnie, waaxda luqadaha, qaybta kaalmada adeegyada, noam bhagat. [facility] [phone].    ATENCIÓN: Si habla español, tiene a freitas disposición servicios gratuitos de asistencia lingüística. Llame al [phone].    We comply with applicable federal civil rights laws and [place] laws. We do not discriminate on the basis of race, color, national origin, age, disability, sex, sexual orientation, or gender identity.            After Visit Summary       This is your record. Keep this with you and show to your community pharmacist(s) and doctor(s) at your next visit.                  "

## 2018-09-15 ENCOUNTER — TRANSFERRED RECORDS (OUTPATIENT)
Dept: HEALTH INFORMATION MANAGEMENT | Facility: CLINIC | Age: 43
End: 2018-09-15

## 2018-09-15 ASSESSMENT — ENCOUNTER SYMPTOMS: HEADACHES: 0

## 2018-09-15 NOTE — DISCHARGE INSTRUCTIONS
Ibuprofen or Naproxen and/or Tylenol scheduled for the next 3-5 days. 600 mg (three tabs) ibuprofen, 440 mg (two tabs) Naproxen, 650-1000 mg of Tylenol.  Ibuprofen and Tylenol are every 6 hours Naproxen is 2 times daily. Follow directions.   Ice or heat to area.  I recommend ice in the first 24-48 hours.    Asymptomatic elevated BP today. Keep log of BP 2-3 times daily for the next week and take those readings to your primary MD.        Discharge Instructions  Hypertension - High Blood Pressure    During you visit to the Emergency Department, your blood pressure was higher than the recommended blood pressure.  This may be related to stress, pain, medication or other temporary conditions. In these cases, your blood pressure may return to normal on its own. If you have a history of high blood pressure, you may need to have your provider adjust your medications. Sometimes, your high measurement here may indicate that you have developed high blood pressure that will stay high unless it is treated. As a general rule, high blood pressure causes problems over years rather than days, weeks, or months. So, while it is important to treat blood pressure, it is rarely important to treat blood pressure immediately. Occasionally we will begin a medication in the Emergency Department; more often we will recommend close follow-up for medications with a primary doctor/clinic.    Generally, every Emergency Department visit should have a follow-up clinic visit with either a primary or a specialty clinic/provider. Please follow-up as instructed by your emergency provider today.    Return to the Emergency Department if you start to have:    A severe headache.    Chest pain.    Shortness of breath.    Weakness or numbness that affects one part of the body.    Confusion.    Vision changes.    Significant swelling of legs and/or eyes.    A reaction to any medication started in the Emergency Department.    What can I do to help  myself?    Avoid alcohol.    Take any blood pressure medicine that you are prescribed.    Get a good night s sleep.    Lower your salt intake.    Exercise.    Lose weight.    Manage stress.    See your doctor regularly    If blood pressure medication was started in the Emergency Department:    The medicine may not have an immediate effect. The body and brain determine what blood pressure you have. The medicine s job is to retrain the body s  thermostat  to a lower blood pressure.    You will need to follow up with your provider to see how this medicine is working for you.  If you were given a prescription for medicine here today, be sure to read all of the information (including the package insert) that comes with your prescription.  This will include important information about the medicine, its side effects, and any warnings that you need to know about.  The pharmacist who fills the prescription can provide more information and answer questions you may have about the medicine.  If you have questions or concerns that the pharmacist cannot address, please call or return to the Emergency Department.   Remember that you can always come back to the Emergency Department if you are not able to see your regular provider in the amount of time listed above, if you get any new symptoms, or if there is anything that worries you.

## 2018-09-15 NOTE — ED PROVIDER NOTES
History     Chief Complaint:  Neck & ankle pain    HPI   Jairo Whittington is a 43 year old male who presents to the ED with neck pain. The patient states that today at 1800 he began having atraumatic left-sided neck pain. He denies any neck injuries or falls.     He also states that for the past two to three days he has had left lateral ankle pain. He denies any trips or falls. He has not taken anything for the pain.     The patient also notes that he has been having daily elevation in his blood pressure. He stated that he used to take medication for it, but is no longer on this.    Allergies:  Amlodipine Besylate     Medications:    Tylenol  Advil/Motrin  Prinzide/Zestoretic  Robaxin  Dyazide     Past Medical History:    Dermatitis  Folliculitis  GERD  Hypertension  Hyperlipidemia    Past Surgical History:    Foot Surgery    Family History:    History reviewed. No pertinent family history.     Social History:  Smoking Status: Never Smoker  Alcohol Use: No  Patient presents Alone  Marital Status:       Review of Systems   Constitutional: Negative for fever.   Eyes: Negative for visual disturbance.   Respiratory: Negative for shortness of breath.    Cardiovascular: Negative for chest pain.   Musculoskeletal: Positive for arthralgias and neck pain.   Neurological: Negative for weakness, numbness and headaches.   All other systems reviewed and are negative.    Physical Exam   First Vitals:  BP: (!) 197/134  Pulse: 67  Temp: 98.5  F (36.9  C)  Resp: 18  SpO2: 98 %      Physical Exam  General: Alert, No obvious discomfort, well kept   HENT:  Normal voice, No lymphadenopathy  Eyes:  The pupils are equal, round, and reactive to light, Conjunctiva normal, No scleral icterus   Neck:  Normal range of motion  CV:  Normal Pulses  Resp:  Non-labored, No cough  MS:  Normal muscular tone, moves all extremities, normal range of motion of neck.  Left lateral trapezius spasming.  No cervical spine tenderness.  Left ankle with its  "minimal lateral malleolar tenderness, no swelling, ambulatory without difficulty.  Skin:  No rash or acute skin lesions noted  Neuro: Speech is normal and fluent strokes so voice throughout  Psych:  Awake. Alert.  Normal affect.  Appropriate interactions. Good eye contact        Emergency Department Course     Past medical records, nursing notes, and vitals reviewed.  2240: I performed an exam of the patient as documented above. Clinical findings and plan explained to the Patient. Patient discharged home with instructions regarding supportive care, medications, and reasons to return as well as the importance of close follow-up were reviewed.      Impression & Plan      Medical Decision Making:  Jairo Whittington is a 43 year old male who presents today for evaluation of left neck discomfort and left ankle discomfort.  Ankle has bothered him for 2 days however he is ambulatory without difficulty.  His neck started suddenly this evening at about 530.  He has not taken any medications for either of these.  There was no trauma to either of these areas.  He felt that he should be evaluated today as he took his blood pressure and it was elevated and young mid 180s.  He did used to take blood pressure medication however he stops taking these taking that he could \"manage it on his own.\"  He stated that \"I was a bonehead.\"  He has no signs or symptoms of hypertensive crisis.  His blood pressure was elevated today.  He does take it on a regular basis and we discussed taking the log of his blood pressure into his primary care provider and that he should most likely start his blood pressure medication again.  There is no indication for imagery regarding his musculoskeletal findings.  I advised him on the correct use of anti-inflammatories and ice or heat.  He appears to be safe and appropriate for outpatient management and is discharged to home.    Diagnosis:    ICD-10-CM    1. Neck pain M54.2    2. Pain in joint involving ankle and " foot, left M25.572    3. Elevated blood pressure reading without diagnosis of hypertension R03.0        Luzmaria Bowling  9/14/2018   [facility] EMERGENCY DEPARTMENT  I, Luzmaria Bowling, am serving as a scribe at 10:40 PM on 9/14/2018 to document services personally performed by Tashi Rosado APRN CNP based on my observations and the provider's statements to me.        Tashi Rosado APRN CNP  09/15/18 0013

## 2018-09-21 ENCOUNTER — APPOINTMENT (OUTPATIENT)
Dept: GENERAL RADIOLOGY | Facility: CLINIC | Age: 43
End: 2018-09-21
Attending: EMERGENCY MEDICINE
Payer: COMMERCIAL

## 2018-09-21 ENCOUNTER — OFFICE VISIT (OUTPATIENT)
Dept: URGENT CARE | Facility: URGENT CARE | Age: 43
End: 2018-09-21
Payer: COMMERCIAL

## 2018-09-21 ENCOUNTER — HOSPITAL ENCOUNTER (EMERGENCY)
Facility: CLINIC | Age: 43
Discharge: HOME | End: 2018-09-21
Attending: EMERGENCY MEDICINE | Admitting: EMERGENCY MEDICINE
Payer: COMMERCIAL

## 2018-09-21 ENCOUNTER — TELEPHONE (OUTPATIENT)
Dept: INTERNAL MEDICINE | Facility: CLINIC | Age: 43
End: 2018-09-21

## 2018-09-21 VITALS
RESPIRATION RATE: 18 BRPM | DIASTOLIC BLOOD PRESSURE: 88 MMHG | HEART RATE: 75 BPM | TEMPERATURE: 98.6 F | HEIGHT: 72 IN | SYSTOLIC BLOOD PRESSURE: 134 MMHG | WEIGHT: 189 LBS | BODY MASS INDEX: 25.6 KG/M2 | OXYGEN SATURATION: 96 %

## 2018-09-21 VITALS
HEART RATE: 72 BPM | OXYGEN SATURATION: 97 % | DIASTOLIC BLOOD PRESSURE: 102 MMHG | RESPIRATION RATE: 20 BRPM | TEMPERATURE: 98.9 F | SYSTOLIC BLOOD PRESSURE: 138 MMHG

## 2018-09-21 DIAGNOSIS — I10 BENIGN ESSENTIAL HYPERTENSION: ICD-10-CM

## 2018-09-21 DIAGNOSIS — R07.9 CHEST PAIN, UNSPECIFIED TYPE: ICD-10-CM

## 2018-09-21 DIAGNOSIS — R07.9 ACUTE CHEST PAIN: Primary | ICD-10-CM

## 2018-09-21 LAB
D DIMER PPP FEU-MCNC: 0.1 UG/ML FEU (ref 0–0.5)
D DIMER PPP FEU-MCNC: 0.3 UG/ML FEU (ref 0–0.5)
ERYTHROCYTE [DISTWIDTH] IN BLOOD BY AUTOMATED COUNT: 13.6 % (ref 10–15)
HCT VFR BLD AUTO: 42.4 % (ref 40–53)
HGB BLD-MCNC: 15 G/DL (ref 13.3–17.7)
MCH RBC QN AUTO: 27.5 PG (ref 26.5–33)
MCHC RBC AUTO-ENTMCNC: 35.4 G/DL (ref 31.5–36.5)
MCV RBC AUTO: 78 FL (ref 78–100)
PLATELET # BLD AUTO: 272 10E9/L (ref 150–450)
RBC # BLD AUTO: 5.46 10E12/L (ref 4.4–5.9)
TROPONIN I BLD-MCNC: 0 UG/L (ref 0–0.1)
TROPONIN I SERPL-MCNC: <0.015 UG/L (ref 0–0.04)
TROPONIN I SERPL-MCNC: <0.015 UG/L (ref 0–0.04)
WBC # BLD AUTO: 3.8 10E9/L (ref 4–11)

## 2018-09-21 PROCEDURE — 85027 COMPLETE CBC AUTOMATED: CPT | Performed by: FAMILY MEDICINE

## 2018-09-21 PROCEDURE — 71046 X-RAY EXAM CHEST 2 VIEWS: CPT

## 2018-09-21 PROCEDURE — 84484 ASSAY OF TROPONIN QUANT: CPT | Performed by: FAMILY MEDICINE

## 2018-09-21 PROCEDURE — 96374 THER/PROPH/DIAG INJ IV PUSH: CPT

## 2018-09-21 PROCEDURE — 93005 ELECTROCARDIOGRAM TRACING: CPT

## 2018-09-21 PROCEDURE — 84484 ASSAY OF TROPONIN QUANT: CPT | Performed by: EMERGENCY MEDICINE

## 2018-09-21 PROCEDURE — 25000128 H RX IP 250 OP 636: Performed by: EMERGENCY MEDICINE

## 2018-09-21 PROCEDURE — 85379 FIBRIN DEGRADATION QUANT: CPT | Performed by: FAMILY MEDICINE

## 2018-09-21 PROCEDURE — 36415 COLL VENOUS BLD VENIPUNCTURE: CPT | Performed by: EMERGENCY MEDICINE

## 2018-09-21 PROCEDURE — 99215 OFFICE O/P EST HI 40 MIN: CPT | Performed by: FAMILY MEDICINE

## 2018-09-21 PROCEDURE — 84484 ASSAY OF TROPONIN QUANT: CPT

## 2018-09-21 PROCEDURE — 85379 FIBRIN DEGRADATION QUANT: CPT | Performed by: EMERGENCY MEDICINE

## 2018-09-21 PROCEDURE — 99284 EMERGENCY DEPT VISIT MOD MDM: CPT | Mod: 25

## 2018-09-21 PROCEDURE — 93000 ELECTROCARDIOGRAM COMPLETE: CPT | Performed by: FAMILY MEDICINE

## 2018-09-21 RX ORDER — KETOROLAC TROMETHAMINE 15 MG/ML
15 INJECTION, SOLUTION INTRAMUSCULAR; INTRAVENOUS ONCE
Status: COMPLETED | OUTPATIENT
Start: 2018-09-21 | End: 2018-09-21

## 2018-09-21 RX ORDER — ACETAMINOPHEN 325 MG/1
325 TABLET ORAL ONCE
Qty: 2 TABLET | Refills: 0
Start: 2018-09-21 | End: 2018-09-21

## 2018-09-21 RX ADMIN — KETOROLAC TROMETHAMINE 15 MG: 15 INJECTION, SOLUTION INTRAMUSCULAR; INTRAVENOUS at 18:46

## 2018-09-21 ASSESSMENT — ENCOUNTER SYMPTOMS
VOMITING: 0
DIAPHORESIS: 0
SHORTNESS OF BREATH: 0
NAUSEA: 0

## 2018-09-21 NOTE — TELEPHONE ENCOUNTER
Reason for Call:  Medication or medication refill:    Do you use a [facility]?  Name of the pharmacy and phone number for the current request:      Name of the medication requested: amlodipine allergic to     Other request:     Can we leave a detailed message on this number? YES    Phone number patient can be reached at: Home number on file [phone] (home)    Best Time: asap    Call taken on 9/21/2018 at 9:57 AM by Debbie Gray

## 2018-09-21 NOTE — ED PROVIDER NOTES
History     Chief Complaint:  Chest Pain     HPI   Jairo Whittington is a 43 year old male, with a history of hypertension and hyperlipidemia, who presents with chest pain. The patient was seen evaluated in past week at both clinic and urgent care facilities for chest discomfort. He notes that his visit to his clinic was regarding his hypertension and had prescribed amlodipine, but notes he had a slight allergic reaction and was switched to hydrochlorothiazide. Recently, the patient states that he has had over 8 hours of constant left sided chest pain that he rates a 5/10 on pain scale. He notes that he was initially evaluated at an urgent care facility where they had performed lab tests as well as EKG and was given aspirin, but referred the patient to the ED due to the chest pain being present for over 8 hours. Here, the patient states that he does not have any associated symptoms including diaphoresis, shortness of breath, nausea, or vomiting. He additionally states that his chest pain does not worsen with deep inhalation or exertion.      Cardiac/PE/DVT Risk Factors:  The patient has a history of hypertension, hyperlipidemia, but no history of diabetes or smoking. The patient denies any personal or familial history of PE, DVT, or clotting disorder. The patient reports no recent travel, surgery, or other immobilizations.        Allergies:  Amlodipine Besylate      Medications:    Aspirin  Lisinopril  Dyazide    Past Medical History:    Dermatitis  Folliculitis  Hypertension  Hyperlipidemia  GERD    Past Surgical History:    Left foot surgery    Family History:    No past pertinent family history.     Social History:  Presents with his wife.  Negative for alcohol use.  Negative for tobacco use.   Marital Status:   [2]     Review of Systems   Constitutional: Negative for diaphoresis.   Respiratory: Negative for shortness of breath.    Cardiovascular: Positive for chest pain.   Gastrointestinal: Negative for nausea  and vomiting.   All other systems reviewed and are negative.      Physical Exam   First Vitals:  BP: (!) 167/113  Pulse: 75  Heart Rate: 75  Temp: 98.6  F (37  C)  Resp: 18  Height: 182.9 cm (6')  Weight: 85.7 kg (189 lb)  SpO2: 99 %    Physical Exam  General: Laying comfortably on gurney   HEENT:   The scalp and head appear normal    The pupils are equal, round, and reactive to light    Extraocular muscles are intact.    The nose is normal.    The oropharynx is normal.      Uvula is in the midline.    Neck:  Normal range of motion.    Lungs:  Clear.      No rales, no wheezing.      There is no tachypnea.  Non-labored.  Cardiac: Regular rate.      Normal S1 and S2.      No pathological murmur/rub    Abdomen: Soft. No distension, no localized tenderness or rebound.  MS:  Normal tone. Normal movement of all extremities.   Neurologic:     Normal mentation.  No cranial nerve deficits.  No focal motor or sensory                            changes.      Speech normal.  Psych:  Awake.     Alert.      Normal affect.      Appropriate interactions.  Skin:  No rash.      No lesions.    Emergency Department Course   ECG:  Indication: Chest pain   Time: 1812  Vent. Rate 78 bpm. SC interval 150. QRS duration 104. QT/QTc 362/412. P-R-T axis 34 -2 -25. Normal sinus rhythm. Moderate voltage criteria for LVH, may be normal variant. T wave abnormality, consider lateral ischemia. Abnormal ECG. Read time: 1822.       HEART Score  Background  Calculates the overall risk of adverse event in patient's presenting with chest pain.  Based on 5 criteria (each assigned 0-2 points) including suspiciousness of history, EKG, age, risk factors and troponin.    Data  43 year old male  has GERD (gastroesophageal reflux disease); Benign essential hypertension; Hyperlipidemia with target LDL less than 130; and Folliculitis on his problem list.   reports that he has never smoked. He has never used smokeless tobacco.  family history includes Family  History Negative in his father and mother.  Lab Results   Component Value Date    TROPI <0.015 09/21/2018     Criteria   0-2 points for each of 5 items (maximum of 10 points):  Score 0- History slightly suspicious for coronary syndrome  Score 1- EKG with Non-specific repolarization disturbance  Score 0- Age <45 years old  Score 1- One to 2 risk factors for atherosclerotic disease  Score 0- Within normal limits for troponin levels  Interpretation  Risk of adverse outcome  Heart Score: 2  Total Score 0-3- Adverse Outcome Risk 2.5% - Supports early discharge with appropriate follow-up      Imaging:   Radiographic findings were communicated with the patient who voiced understanding of the findings.  XR Chest 2 views:   The lungs are clear. No focal pulmonary opacities. Heart  and mediastinum are unremarkable. No acute cardiopulmonary  abnormalities. As per radiology.    Laboratory:  D Dimer: 0.3  1841 Troponin POCT: 0.00  2028 Troponin: <0.015    Interventions:   1846 Toradol, 15 mg, IV    Emergency Department Course:  Nursing notes and vitals reviewed. EKG was obtained, findings above.      1829  I performed an exam of the patient as documented above.     IV inserted. Medicine administered as documented above. Blood drawn. This was sent to the lab for further testing, results above.    The patient was sent for a chest XR while in the emergency department, findings above.     2118 I consulted with Dr. Richards, cardiology, regarding the patient's history and presentation here in the emergency department.     2136 I rechecked the patient and discussed the results of his workup thus far.     Findings and plan explained to the Patient. Patient discharged home with instructions regarding supportive care, medications, and reasons to return. The importance of close follow-up was reviewed.     I personally reviewed the laboratory results with the Patient and answered all related questions prior to discharge.       Impression & Plan       Medical Decision Making:  Jairo Whittington is a 43 year old male who comes in with chest pain that had been going on for 8 hours. He had already a set of enzymes done at clinic and sent over here. His HEART Score is 2, however, I had a question regarding his ECG. I felt that he had LVH today with left ventricular strain pattern consistent with repolarization changes of LVH. I contacted the cardiologist on call who also reviewed it with me. He also felt that these are repolarization changes.    The patient does have a history of LVH as well. The last EKG was done in 2016 at that time and did not show a strain pattern. However, today, his pain does not seem to be ischemic. Again, with a HEART score of 2 and no ischemic changes present, I felt that the patient could be discharged home and follow up for an outpatient stress test. The cardiologist on call also asked us to set him up with a structure cardiogram due to his LVH. The patient understands the plan. He will also return in the interim if he should develop a change in his symptoms.       Diagnosis:    ICD-10-CM    1. Chest pain, unspecified type R07.9 Exercise Stress Echocardiogram   2. Benign essential hypertension I10 Echocardiogram Complete       Disposition:  discharged to home    I, Merry Barron, am serving as a scribe on 9/21/2018 at 6:29 PM to personally document services performed by Jamie Erazo MD based on my observations and the provider's statements to me.      Merry Barron  9/21/2018   [facility] EMERGENCY DEPARTMENT       Jamie Erazo MD  09/22/18 0246

## 2018-09-21 NOTE — ED TRIAGE NOTES
Mid chest pain, mid chest extending into the left side of the chest for 8 hours. Denies N/V/SOB. Denies similar symptoms in the past. Sent to ED from urgent care

## 2018-09-21 NOTE — TELEPHONE ENCOUNTER
Spoke with patient who states that he began taking Amlodipine one week ago as prescribed by a physician at [facility].  He just remembered now that he is allergic to Amlodipine.  Patient reports having chest pain that began this morning and wonders if this could be related to the medication.  Writer told patient that it's hard to determine if chest pain is related but advised patient to be seen in ED now.      NURSING ASSESSMENT:  Description:  Aching  Onset/duration:  This morning  Precip. Factors:  N/A  Associated symptoms:  Neck pain  Improves/worsens symptoms:  N/A  Pain scale (0-10)   5/10    Allergies:   Allergies   Allergen Reactions     Amlodipine Besylate      headaches         NURSING PLAN: Nursing advice to patient TO ED    RECOMMENDED DISPOSITION:  To ED, another person to drive - family member  Will comply with recommendation: Yes  If further questions/concerns or if symptoms do not improve, worsen or new symptoms develop, call your PCP or [facility] as soon as possible.      Guideline used:  Telephone Triage Protocols for Nurses, Fifth Edition, Diana Montoya RN

## 2018-09-21 NOTE — ED AVS SNAPSHOT
[facility] Emergency Department    [address]    [facility] [address]    Phone:  [phone]    Fax:  [phone]                                       Jairo Whittington   MRN: [id number]    Department:  [facility] Emergency Department   Date of Visit:  9/21/2018           After Visit Summary Signature Page     I have received my discharge instructions, and my questions have been answered. I have discussed any challenges I see with this plan with the nurse or doctor.    ..........................................................................................................................................  Patient/Patient Representative Signature      ..........................................................................................................................................  Patient Representative Print Name and Relationship to Patient    ..................................................               ................................................  Date                                   Time    ..........................................................................................................................................  Reviewed by Signature/Title    ...................................................              ..............................................  Date                                               Time          22EPIC Rev 08/18

## 2018-09-21 NOTE — PROGRESS NOTES
Chief Complaint   Patient presents with     Urgent Care     Pt c/o chest pain. Pt rate chest pain a 5.  Pt states he is allergic to amlodipine, but have been taking the medication for a week.      SUBJECTIVE:  Jairo Whittington is a 43 year old male who presents to the office with the CC of chest pain.  Patient complains of chest pain.  The pain is characterized as 5 out of 10 achey located left chest with radiation to none. Symptoms began 8 hour(s) ago, sudden onset  Pain is associated with none.  Pain is exacerbated by no known provoking events.  Pain is relieved by none.  Cardiac risk factors: hypertension    Past Medical History:   Diagnosis Date     Dermatitis      Folliculitis  June 2011    scalp     HTN (hypertension)      Hyperlipidemia LDL goal < 130          Current Outpatient Prescriptions:      aspirin 325 MG EC tablet, Take 1 tablet (325 mg) by mouth once for 1 dose, Disp: 1 tablet, Rfl: 0     ibuprofen (ADVIL/MOTRIN) 600 MG tablet, Take 1 tablet (600 mg) by mouth every 6 hours as needed for moderate pain, Disp: 20 tablet, Rfl: 1     methocarbamol (ROBAXIN) 750 MG tablet, Take 1 tablet (750 mg) by mouth 4 times daily as needed for muscle spasms, Disp: 15 tablet, Rfl: 0     triamterene-hydrochlorothiazide (DYAZIDE) 37.5-25 MG per capsule, Take 1 capsule by mouth daily, Disp: 30 capsule, Rfl: 0     Acetaminophen (TYLENOL PO), Take by mouth as needed for mild pain or fever Reported on 5/3/2017, Disp: , Rfl:      lisinopril-hydrochlorothiazide (PRINZIDE/ZESTORETIC) 20-25 MG per tablet, 1/2 TABLET PER DAY IN THE MORNING FOR ONE WEEK, THE 1 TABLET PER DAY IN THE MORNING (Patient not taking: Reported on 9/21/2018), Disp: 30 tablet, Rfl: 0    Social History   Substance Use Topics     Smoking status: Never Smoker     Smokeless tobacco: Never Used     Alcohol use No       ROS:  10 point ROS of systems including Constitutional, Eyes, Respiratory, Cardiovascular, Gastroenterology, Genitourinary, Integumentary,   Psychiatric were all negative except for pertinent positives noted in my HPI     Results for orders placed or performed in visit on 09/21/18   Troponin I   Result Value Ref Range    Troponin I ES <0.015 0.000 - 0.045 ug/L   CBC with platelets   Result Value Ref Range    WBC 3.8 (L) 4.0 - 11.0 10e9/L    RBC Count 5.46 4.4 - 5.9 10e12/L    Hemoglobin 15.0 13.3 - 17.7 g/dL    Hematocrit 42.4 40.0 - 53.0 %    MCV 78 78 - 100 fl    MCH 27.5 26.5 - 33.0 pg    MCHC 35.4 31.5 - 36.5 g/dL    RDW 13.6 10.0 - 15.0 %    Platelet Count 272 150 - 450 10e9/L   D dimer, quantitative   Result Value Ref Range    D Dimer 0.1 0.0 - 0.50 ug/ml FEU         EXAM:  BP (!) 138/102  Pulse 72  Temp 98.9  F (37.2  C) (Oral)  Resp 20  SpO2 97%  GENERAL APPEARANCE: healthy, alert and no distress  EYES: EOMI,  PERRL, conjunctiva clear  HENT: ear canals and TM's normal.  Nose and mouth without ulcers, erythema or lesions  NECK: supple, nontender, no lymphadenopathy  RESP: lungs clear to auscultation - no rales, rhonchi or wheezes  CV: regular rates and rhythm, normal S1 S2, no murmur noted  ABDOMEN:  soft, nontender, no HSM or masses and bowel sounds normal  SKIN: no suspicious lesions or rashes  PSYCH: mentation appears normal     Office EKG demonstrates:  appears normal, NSR, nonspecific ST-T changes, appears normal, NSR,normal axis, normal intervals, no acute ST/T changes c/w ischemia,no LVH by voltage criteria,unchanged from previous tracings  EKG was done due to chest pain       Assessment  / IMPRESSION  Chest pain, acute   Jairo was seen today for urgent care.    Diagnoses and all orders for this visit:    Acute chest pain  -     Troponin I  -     CBC with platelets  -     EKG 12-lead complete w/read - Clinics  -     D dimer, quantitative  -     Discontinue: acetaminophen (TYLENOL) 325 MG tablet; Take 1 tablet (325 mg) by mouth once for 1 dose  -     aspirin 325 MG EC tablet; Take 1 tablet (325 mg) by mouth once for 1 dose    Benign  essential hypertension          PLAN:See orders in epic  Advised to f/u in ER for further work up   As his chest pain continued   His wife will drive him in

## 2018-09-21 NOTE — MR AVS SNAPSHOT
"              After Visit Summary   2018    Jairo Whittington    MRN: [id number]           Patient Information     Date Of Birth          1975        Visit Information        Provider Department      2018 4:00 PM Gissell Mattson MD [facility]        Today's Diagnoses     Acute chest pain    -  1    Benign essential hypertension           Follow-ups after your visit        Who to contact     If you have questions or need follow up information about today's clinic visit or your schedule please contact [facility] directly at [phone].  Normal or non-critical lab and imaging results will be communicated to you by [employer], letter or phone within 4 business days after the clinic has received the results. If you do not hear from us within 7 days, please contact the clinic through [employer] or phone. If you have a critical or abnormal lab result, we will notify you by phone as soon as possible.  Submit refill requests through [employer] or call your pharmacy and they will forward the refill request to us. Please allow 3 business days for your refill to be completed.          Additional Information About Your Visit        MyChart Information     [facility] [employer] [facility] lets you send messages to your doctor, view your test results, renew your prescriptions, schedule appointments and more. To sign up, go to [email or web address] . Click on \"Log in\" on the left side of the screen, which will take you to the Welcome page. Then click on \"Sign up Now\" on the right side of the page.     You will be asked to enter the access code listed below, as well as some personal information. Please follow the directions to create your username and password.     Your access code is: [email or web address]  Expires: 2018 11:15 PM     Your access code will  in 90 days. If you need help or a new code, please call your [place] clinic or [phone].        Care EveryWhere ID     This is your " Care EveryWhere ID. This could be used by other organizations to access your [place] medical records  [id number]        Your Vitals Were     Pulse Temperature Respirations Pulse Oximetry          72 98.9  F (37.2  C) (Oral) 20 97%         Blood Pressure from Last 3 Encounters:   09/21/18 (!) 138/102   09/14/18 (!) 175/129   05/03/17 (!) 160/118    Weight from Last 3 Encounters:   05/03/17 198 lb (89.8 kg)   03/31/17 199 lb 8 oz (90.5 kg)   10/25/16 202 lb 8 oz (91.9 kg)              We Performed the Following     CBC with platelets     D dimer, quantitative     EKG 12-lead complete w/read - Clinics     Troponin I          Today's Medication Changes          These changes are accurate as of 9/21/18  5:12 PM.  If you have any questions, ask your nurse or doctor.               Start taking these medicines.        Dose/Directions    aspirin 325 MG EC tablet   Used for:  Acute chest pain   Started by:  Gissell Mattson MD        Dose:  325 mg   Take 1 tablet (325 mg) by mouth once for 1 dose   Quantity:  1 tablet   Refills:  0            Where to get your medicines      Some of these will need a paper prescription and others can be bought over the counter.  Ask your nurse if you have questions.     You don't need a prescription for these medications     aspirin 325 MG EC tablet                Primary Care Provider Fax #    Physician No Ref-Primary [phone]       No address on file        Equal Access to Services     NICOLE FU AH: Jarad Byers, waquinn fonseca, qaybta kaalmadevin crowe, noam bee . So [facility] [phone].    ATENCIÓN: Si habla español, tiene a freitas disposición servicios gratuitos de asistencia lingüística. Llame al [phone].    We comply with applicable federal civil rights laws and [place] laws. We do not discriminate on the basis of race, color, national origin, age, disability, sex, sexual orientation, or gender identity.            Thank you!      Thank you for choosing [facility]  for your care. Our goal is always to provide you with excellent care. Hearing back from our patients is one way we can continue to improve our services. Please take a few minutes to complete the written survey that you may receive in the mail after your visit with us. Thank you!             Your Updated Medication List - Protect others around you: Learn how to safely use, store and throw away your medicines at www.disposemymeds.org.          This list is accurate as of 9/21/18  5:12 PM.  Always use your most recent med list.                   Brand Name Dispense Instructions for use Diagnosis    aspirin 325 MG EC tablet     1 tablet    Take 1 tablet (325 mg) by mouth once for 1 dose    Acute chest pain       ibuprofen 600 MG tablet    ADVIL/MOTRIN    20 tablet    Take 1 tablet (600 mg) by mouth every 6 hours as needed for moderate pain        lisinopril-hydrochlorothiazide 20-25 MG per tablet    PRINZIDE/ZESTORETIC    30 tablet    1/2 TABLET PER DAY IN THE MORNING FOR ONE WEEK, THE 1 TABLET PER DAY IN THE MORNING    Benign essential hypertension       methocarbamol 750 MG tablet    ROBAXIN    15 tablet    Take 1 tablet (750 mg) by mouth 4 times daily as needed for muscle spasms        triamterene-hydrochlorothiazide 37.5-25 MG per capsule    DYAZIDE    30 capsule    Take 1 capsule by mouth daily    Essential hypertension       TYLENOL PO      Take by mouth as needed for mild pain or fever Reported on 5/3/2017

## 2018-09-24 LAB — INTERPRETATION ECG - MUSE: NORMAL

## 2018-12-30 ENCOUNTER — OFFICE VISIT (OUTPATIENT)
Dept: URGENT CARE | Facility: URGENT CARE | Age: 43
End: 2018-12-30
Payer: COMMERCIAL

## 2018-12-30 VITALS
BODY MASS INDEX: 26.34 KG/M2 | SYSTOLIC BLOOD PRESSURE: 159 MMHG | WEIGHT: 194.2 LBS | HEART RATE: 95 BPM | DIASTOLIC BLOOD PRESSURE: 106 MMHG | RESPIRATION RATE: 20 BRPM | TEMPERATURE: 103.8 F

## 2018-12-30 DIAGNOSIS — J10.1 INFLUENZA A: Primary | ICD-10-CM

## 2018-12-30 DIAGNOSIS — R05.9 COUGH: ICD-10-CM

## 2018-12-30 DIAGNOSIS — R50.9 FEVER AND CHILLS: ICD-10-CM

## 2018-12-30 LAB
FLUAV+FLUBV AG SPEC QL: NEGATIVE
FLUAV+FLUBV AG SPEC QL: POSITIVE
SPECIMEN SOURCE: ABNORMAL

## 2018-12-30 PROCEDURE — 87804 INFLUENZA ASSAY W/OPTIC: CPT | Performed by: FAMILY MEDICINE

## 2018-12-30 PROCEDURE — 99214 OFFICE O/P EST MOD 30 MIN: CPT | Performed by: FAMILY MEDICINE

## 2018-12-30 RX ORDER — ACETAMINOPHEN 325 MG/1
325-650 TABLET ORAL EVERY 6 HOURS PRN
Qty: 1 BOTTLE | Refills: 0 | Status: SHIPPED | OUTPATIENT
Start: 2018-12-30 | End: 2019-01-29

## 2018-12-30 RX ORDER — OSELTAMIVIR PHOSPHATE 75 MG/1
75 CAPSULE ORAL 2 TIMES DAILY
Qty: 10 CAPSULE | Refills: 0 | Status: SHIPPED | OUTPATIENT
Start: 2018-12-30 | End: 2019-01-04

## 2018-12-31 NOTE — PROGRESS NOTES
SUBJECTIVE: Jairo Whittington is a 43 year old male presenting with a chief complaint of fever, nasal congestion and cough .  Onset of symptoms was 1 day(s) ago.  Course of illness is worsening.    Severity moderate  Current and Associated symptoms: stuffy nose and cough - non-productive  Treatment measures tried include Tylenol/Ibuprofen.  Predisposing factors include None and exposure to influenza.    Past Medical History:   Diagnosis Date     Dermatitis      Folliculitis  June 2011    scalp     HTN (hypertension)      Hyperlipidemia LDL goal < 130      Allergies   Allergen Reactions     Amlodipine Besylate      headaches     Social History     Tobacco Use     Smoking status: Never Smoker     Smokeless tobacco: Never Used   Substance Use Topics     Alcohol use: No       ROS:  SKIN: no rash  GI: no vomiting    OBJECTIVE:  BP (!) 159/106 (BP Location: Right arm, Patient Position: Sitting, Cuff Size: Adult Large)   Pulse 95   Temp 103.8  F (39.9  C) (Tympanic)   Resp 20   Wt 88.1 kg (194 lb 3.2 oz)   BMI 26.34 kg/m  GENERAL APPEARANCE: healthy, alert and no distress  EYES: EOMI,  PERRL, conjunctiva clear  HENT: ear canals and TM's normal.  Nose and mouth without ulcers, erythema or lesions  NECK: supple, nontender, no lymphadenopathy  RESP: lungs clear to auscultation - no rales, rhonchi or wheezes  SKIN: no suspicious lesions or rashes      ICD-10-CM    1. Influenza A J10.1 oseltamivir (TAMIFLU) 75 MG capsule     acetaminophen (TYLENOL) 325 MG tablet   2. Fever and chills R50.9 Influenza A/B antigen     oseltamivir (TAMIFLU) 75 MG capsule   3. Cough R05 Influenza A/B antigen     Fluids/Rest, f/u if worse/not any better

## 2018-12-31 NOTE — PATIENT INSTRUCTIONS
Patient Education     Influenza (Adult)    Influenza is also called the flu. It is a viral illness that affects the air passages of your lungs. It is different from the common cold. The flu can easily be passed from one to person to another. It may be spread through the air by coughing and sneezing. Or it can be spread by touching the sick person and then touching your own eyes, nose, or mouth.  The flu starts 1 to 3 days after you are exposed to the flu virus. It may last for 1 to 2 weeks but many people feel tired or fatigued for many weeks afterward. You usually don t need to take antibiotics unless you have a complication. This might be an ear or sinus infection or pneumonia.  Symptoms of the flu may be mild or severe. They can include extreme tiredness (wanting to stay in bed all day), chills, fevers, muscle aches, soreness with eye movement, headache, and a dry, hacking cough.  Home care  Follow these guidelines when caring for yourself at home:    Avoid being around cigarette smoke, whether yours or other people s.    Acetaminophen or ibuprofen will help ease your fever, muscle aches, and headache. Don t give aspirin to anyone younger than 18 who has the flu. Aspirin can harm the liver.    Nausea and loss of appetite are common with the flu. Eat light meals. Drink 6 to 8 glasses of liquids every day. Good choices are water, sport drinks, soft drinks without caffeine, juices, tea, and soup. Extra fluids will also help loosen secretions in your nose and lungs.    Over-the-counter cold medicines will not make the flu go away faster. But the medicines may help with coughing, sore throat, and congestion in your nose and sinuses. Don t use a decongestant if you have high blood pressure.    Stay home until your fever has been gone for at least 24 hours without using medicine to reduce fever.  Follow-up care  Follow up with your healthcare provider, or as advised, if you are not getting better over the next week.   If you are age 65 or older, talk with your provider about getting a pneumococcal vaccine every 5 years. You should also get this vaccine if you have chronic asthma or COPD. All adults should get a flu vaccine every fall. Ask your provider about this.  When to seek medical advice  Call your healthcare provider right away if any of these occur:    Cough with lots of colored mucus (sputum) or blood in your mucus    Chest pain, shortness of breath, wheezing, or trouble breathing    Severe headache, or face, neck, or ear pain    New rash with fever    Fever of 100.4 F (38 C) or higher, or as directed by your healthcare provider    Confusion, behavior change, or seizure    Severe weakness or dizziness    You get a new fever or cough after getting better for a few days  Date Last Reviewed: 1/1/2017 2000-2018 [employer]. [address] [place] [address], [place] [address]. All rights reserved. This information is not intended as a substitute for professional medical care. Always follow your healthcare professional's instructions.

## 2019-05-24 NOTE — ED AVS SNAPSHOT
[facility] Emergency Department    [address]    [facility] [address]    Phone:  [phone]    Fax:  [phone]                                       Jairo Whittington   MRN: [id number]    Department:  [facility] Emergency Department   Date of Visit:  9/21/2018           Patient Information     Date Of Birth          1975        Your diagnoses for this visit were:     Chest pain, unspecified type     Benign essential hypertension        You were seen by Jamie Erazo MD.      Follow-up Information     Follow up with Clinic, [facility] In 3 days.    Contact information:    [address] [address]  [phone]          Discharge Instructions       Discharge Instructions  Chest Pain    You have been seen today for chest pain or discomfort.  At this time, your doctor has found no signs that your chest pain is due to a serious or life-threatening condition, (or you have declined more testing and/or admission to the hospital). However, sometimes there is a serious problem that does not show up right away. Your evaluation today may not be complete and you may need further testing and evaluation.     You need to follow-up with your regular doctor within 3 days.    Return to the Emergency Department if:    Your chest pain changes, gets worse, starts to happen more often, or comes with less activity.    You are short of breath.    You get very weak or tired.    You pass out or faint.    You have any new symptoms, like fever, cough, numb legs, or you cough up blood.    You have anything else that worries you.    Until you follow-up with your regular doctor please do the following:    Take one aspirin daily unless you have an allergy or are told not to by your doctor.    If a stress test appointment has been made, go to the appointment.    If you have questions, contact your regular doctor.    If your doctor today has told you to follow-up with your regular  doctor, it is very important that you make an appointment with your clinic and go to the appointment.  If you do not follow-up with your primary doctor, it may result in missing an important development which could result in permanent injury or disability and/or lasting pain.  If there is any problem keeping your appointment, call your doctor or return to the Emergency Department.    If you were given a prescription for medicine here today, be sure to read all of the information (including the package insert) that comes with your prescription.  This will include important information about the medicine, its side effects, and any warnings that you need to know about.  The pharmacist who fills the prescription can provide more information and answer questions you may have about the medicine.  If you have questions or concerns that the pharmacist cannot address, please call or return to the Emergency Department.     Opioid Medication Information    Pain medications are among the most commonly prescribed medicines, so we are including this information for all our patients. If you did not receive pain medication or get a prescription for pain medicine, you can ignore it.     You may have been given a prescription for an opioid (narcotic) pain medicine and/or have received a pain medicine while here in the Emergency Department. These medicines can make you drowsy or impaired. You must not drive, operate dangerous equipment, or engage in any other dangerous activities while taking these medications. If you drive while taking these medications, you could be arrested for DUI, or driving under the influence. Do not drink any alcohol while you are taking these medications.     Opioid pain medications can cause addiction. If you have a history of chemical dependency of any type, you are at a higher risk of becoming addicted to pain medications.  Only take these prescribed medications to treat your pain when all other options  have been tried. Take it for as short a time and as few doses as possible. Store your pain pills in a secure place, as they are frequently stolen and provide a dangerous opportunity for children or visitors in your house to start abusing these powerful medications. We will not replace any lost or stolen medicine.  As soon as your pain is better, you should flush all your remaining medication.     Many prescription pain medications contain Tylenol  (acetaminophen), including Vicodin , Tylenol #3 , Norco , Lortab , and Percocet .  You should not take any extra pills of Tylenol  if you are using these prescription medications or you can get very sick.  Do not ever take more than 3000 mg of acetaminophen in any 24 hour period.    All opioids tend to cause constipation. Drink plenty of water and eat foods that have a lot of fiber, such as fruits, vegetables, prune juice, apple juice and high fiber cereal.  Take a laxative if you don t move your bowels at least every other day. Miralax , Milk of Magnesia, Colace , or Senna  can be used to keep you regular.      Remember that you can always come back to the Emergency Department if you are not able to see your regular doctor in the amount of time listed above, if you get any new symptoms, or if there is anything that worries you.          24 Hour Appointment Hotline       To make an appointment at any [facility], call [phone] ([phone]). If you don't have a family doctor or clinic, we will help you find one. [facility] are conveniently located to serve the needs of you and your family.          ED Discharge Orders     Echocardiogram Complete       Administration of IV contrast will be tailored to this examination per the appropriate written protocol listed in the Echocardiography department Protocol Book, or by the supervising Cardiologist. This may result in an order change.    Use of contrast is at the discretion of the supervising Cardiologist.             Exercise Stress Echocardiogram       Administration of IV contrast will be tailored to this examination per the appropriate written protocol listed in the Echocardiography department Protocol Book, or by the supervising Cardiologist. This may result in an order change.    Use of contrast is at the discretion of the supervising Cardiologist.                     Review of your medicines      Our records show that you are taking the medicines listed below. If these are incorrect, please call your family doctor or clinic.        Dose / Directions Last dose taken    aspirin 325 MG EC tablet   Dose:  325 mg   Quantity:  1 tablet        Take 1 tablet (325 mg) by mouth once for 1 dose   Refills:  0        ibuprofen 600 MG tablet   Commonly known as:  ADVIL/MOTRIN   Dose:  600 mg   Quantity:  20 tablet        Take 1 tablet (600 mg) by mouth every 6 hours as needed for moderate pain   Refills:  1        lisinopril-hydrochlorothiazide 20-25 MG per tablet   Commonly known as:  PRINZIDE/ZESTORETIC   Quantity:  30 tablet        1/2 TABLET PER DAY IN THE MORNING FOR ONE WEEK, THE 1 TABLET PER DAY IN THE MORNING   Refills:  0        triamterene-hydrochlorothiazide 37.5-25 MG per capsule   Commonly known as:  DYAZIDE   Dose:  1 capsule   Quantity:  30 capsule        Take 1 capsule by mouth daily   Refills:  0        TYLENOL PO        Take by mouth as needed for mild pain or fever Reported on 5/3/2017   Refills:  0                Procedures and tests performed during your visit     Cardiac Continuous Monitoring    D dimer quantitative    EKG 12 lead    ISTAT troponin nursing POCT    Peripheral IV: Standard    Pulse oximetry nursing    Review medications with patient    Troponin I    Troponin POCT    XR Chest 2 Views      Orders Needing Specimen Collection     None      Pending Results     Date and Time Order Name Status Description    9/21/2018 1822 EKG 12 lead Preliminary             Pending Culture Results     No orders  found from 9/19/2018 to 9/22/2018.            Pending Results Instructions     If you had any lab results that were not finalized at the time of your Discharge, you can call the ED Lab Result RN at [phone]. You will be contacted by this team for any positive Lab results or changes in treatment. The nurses are available 7 days a week from 10A to 6:30P.  You can leave a message 24 hours per day and they will return your call.        Test Results From Your Hospital Stay        9/21/2018  7:32 PM      Narrative     XR CHEST 2 VW 9/21/2018 7:25 PM    HISTORY: Pain.    COMPARISON: October 25, 2016.         Impression     IMPRESSION: The lungs are clear. No focal pulmonary opacities. Heart  and mediastinum are unremarkable. No acute cardiopulmonary  abnormalities.    ROGER ARAIZA MD         9/21/2018  7:03 PM      Component Results     Component Value Ref Range & Units Status    D Dimer 0.3 0.0 - 0.50 ug/ml FEU Final    This D-dimer assay is intended for use in conjunction with a clinical pretest   probability assessment model to exclude pulmonary embolism (PE) and deep   venous thrombosis (DVT) in outpatients suspected of PE or DVT. The cut-off   value is 0.5 ug/mL FEU.           9/21/2018  6:52 PM      Component Results     Component Value Ref Range & Units Status    Troponin I 0.00 0.00 - 0.10 ug/L Final         9/21/2018  9:00 PM      Component Results     Component Value Ref Range & Units Status    Troponin I ES <0.015 0.000 - 0.045 ug/L Final    The 99th percentile for upper reference range is 0.045 ug/L.  Troponin values   in the range of 0.045 - 0.120 ug/L may be associated with risks of adverse   clinical events.                  Clinical Quality Measure: Blood Pressure Screening     Your blood pressure was checked while you were in the emergency department today. The last reading we obtained was  BP: 134/88 . Please read the guidelines below about what these numbers mean and what you should do about  "them.  If your systolic blood pressure (the top number) is less than 120 and your diastolic blood pressure (the bottom number) is less than 80, then your blood pressure is normal. There is nothing more that you need to do about it.  If your systolic blood pressure (the top number) is 120-139 or your diastolic blood pressure (the bottom number) is 80-89, your blood pressure may be higher than it should be. You should have your blood pressure rechecked within a year by a primary care provider.  If your systolic blood pressure (the top number) is 140 or greater or your diastolic blood pressure (the bottom number) is 90 or greater, you may have high blood pressure. High blood pressure is treatable, but if left untreated over time it can put you at risk for heart attack, stroke, or kidney failure. You should have your blood pressure rechecked by a primary care provider within the next 4 weeks.  If your provider in the emergency department today gave you specific instructions to follow-up with your doctor or provider even sooner than that, you should follow that instruction and not wait for up to 4 weeks for your follow-up visit.        Thank you for choosing [facility]       Thank you for choosing [facility] for your care. Our goal is always to provide you with excellent care. Hearing back from our patients is one way we can continue to improve our services. Please take a few minutes to complete the written survey that you may receive in the mail after you visit with us. Thank you!        [employer] Information     [employer] lets you send messages to your doctor, view your test results, renew your prescriptions, schedule appointments and more. To sign up, go to [email or web address] . Click on \"Log in\" on the left side of the screen, which will take you to the Welcome page. Then click on \"Sign up Now\" on the right side of the page.     You will be asked to enter the access code listed below, as well as some personal " information. Please follow the directions to create your username and password.     Your access code is: [email or web address]  Expires: 2018 11:15 PM     Your access code will  in 90 days. If you need help or a new code, please call your [place] clinic or [phone].        Care EveryWhere ID     This is your Care EveryWhere ID. This could be used by other organizations to access your [place] medical records  [id number]        Equal Access to Services     JOE [facility] HospitalGUIDO : Hadii aad ku hadasho Soomaali, waaxda luqadaha, qaybta kaalmada adereannayadevin, noam bee . So [facility] [phone].    ATENCIÓN: Si habla español, tiene a freitas disposición servicios gratuitos de asistencia lingüística. Llame al [phone].    We comply with applicable federal civil rights laws and [place] laws. We do not discriminate on the basis of race, color, national origin, age, disability, sex, sexual orientation, or gender identity.            After Visit Summary       This is your record. Keep this with you and show to your community pharmacist(s) and doctor(s) at your next visit.                   fatigue, feeling overwhelmed

## 2021-08-05 NOTE — ED TRIAGE NOTES
Patient presents with left sided neck pain without injury that started at 6 pm today and left ankle pain that started 2 days ago. Patient ambulates without problem. ABC's intact.   
[id number]

## 2021-09-27 ENCOUNTER — VIRTUAL VISIT (OUTPATIENT)
Dept: FAMILY MEDICINE | Facility: CLINIC | Age: 46
End: 2021-09-27
Payer: COMMERCIAL

## 2021-09-27 DIAGNOSIS — Z11.59 SCREENING FOR VIRAL DISEASE: Primary | ICD-10-CM

## 2021-09-27 PROCEDURE — 99212 OFFICE O/P EST SF 10 MIN: CPT | Mod: 95 | Performed by: NURSE PRACTITIONER

## 2021-09-27 NOTE — PROGRESS NOTES
Jairo is a 46 year old who is being evaluated via a billable telephone visit.      What phone number would you like to be contacted at? [phone]  How would you like to obtain your AVS? NA    Assessment & Plan   Problem List Items Addressed This Visit     None      Visit Diagnoses     Screening for viral disease    -  Primary    Relevant Orders    Asymptomatic COVID-19 Virus (Coronavirus) by PCR           Needs covid test to return to work since he is not fully vaccinated yet     GERARDO Cook CNP  [facility]    Subjective   Jairo is a 46 year old who presents for the following health issues     HPI     Covid Testing  No exposure and no symptoms   Had first shot of vaccine   Is now going into office part time       Review of Systems   Detailed as above       Objective           Vitals:  No vitals were obtained today due to virtual visit.    Physical Exam   healthy, alert and no distress  PSYCH: Alert and oriented times 3; coherent speech, normal   rate and volume, able to articulate logical thoughts, able   to abstract reason, no tangential thoughts, no hallucinations   or delusions  His affect is normal  RESP: No cough, no audible wheezing, able to talk in full sentences  Remainder of exam unable to be completed due to telephone visits            Phone call duration: 1 minutes

## 2023-05-13 ENCOUNTER — HOSPITAL ENCOUNTER (EMERGENCY)
Facility: CLINIC | Age: 48
Discharge: HOME | End: 2023-05-13
Attending: EMERGENCY MEDICINE | Admitting: EMERGENCY MEDICINE
Payer: COMMERCIAL

## 2023-05-13 VITALS
DIASTOLIC BLOOD PRESSURE: 107 MMHG | HEART RATE: 67 BPM | RESPIRATION RATE: 16 BRPM | OXYGEN SATURATION: 98 % | TEMPERATURE: 97.3 F | SYSTOLIC BLOOD PRESSURE: 183 MMHG

## 2023-05-13 DIAGNOSIS — K04.7 DENTAL ABSCESS: ICD-10-CM

## 2023-05-13 PROCEDURE — 99284 EMERGENCY DEPT VISIT MOD MDM: CPT

## 2023-05-13 PROCEDURE — 250N000013 HC RX MED GY IP 250 OP 250 PS 637: Performed by: EMERGENCY MEDICINE

## 2023-05-13 RX ORDER — OXYCODONE HYDROCHLORIDE 5 MG/1
5 TABLET ORAL EVERY 4 HOURS PRN
Qty: 10 TABLET | Refills: 0 | Status: SHIPPED | OUTPATIENT
Start: 2023-05-13

## 2023-05-13 RX ORDER — ACETAMINOPHEN 325 MG/1
650 TABLET ORAL ONCE
Status: COMPLETED | OUTPATIENT
Start: 2023-05-13 | End: 2023-05-13

## 2023-05-13 RX ADMIN — ACETAMINOPHEN 650 MG: 325 TABLET ORAL at 08:13

## 2023-05-13 RX ADMIN — AMOXICILLIN AND CLAVULANATE POTASSIUM 1 TABLET: 875; 125 TABLET, FILM COATED ORAL at 08:13

## 2023-05-13 NOTE — ED PROVIDER NOTES
History     Chief Complaint:  Dental Pain     The history is provided by the patient.      Jairo Whittington is a 47 year old male with a history of hypertension who presents with dental pain. Patient reports 2 days ago he had a cavity filled in the upper right side of his mouth. He states for the past 2.5 days since the filling he has had a pounding pain in that tooth that makes it difficult to sleep. He notes he had some pain before the filling, but this is worse. He reports he was prescribed Clindamycin and was told to take 1 tablet 4 times daily for 7 days. He reports since starting the Clindamycin he has had a sore throat and abdominal pain. He reports no known allergies to penicillin. He states he has hypertension and takes his medications as prescribed, but did not take his medication yet this morning. He reports no facial swelling. He notes he feels he might need to have the tooth removed soon.     Independent Historian:   None     Review of External Notes: None    ROS:  See HPI    Allergies:  Amlodipine Besylate     Medications:    Zestril  Lipitor  Prilosec   Prinzide  Dyazide     Past Medical History:    Hypertension  Hyperlipidemia  GERD  Prediabetes     Past Surgical History:    Left foot surgery     Family History:    Mother- Alzheimer's disease     Social History:  Patient presents to the ED via private vehicle alone.    Physical Exam     Patient Vitals for the past 24 hrs:   BP Temp Pulse Resp SpO2   05/13/23 0706 (!) 183/107 97.3  F (36.3  C) 67 16 98 %      Physical Exam  Nursing note and vitals reviewed.  Constitutional:  Appears well-developed and well-nourished.   HENT:   Head:    No visible facial swelling or discoloration. No tenderness to the face or neck. Tenderness to tooth #6 without any visible or palpable gingival swelling. No drainage.   Mouth/Throat:   Oropharynx is clear and moist. No oropharyngeal exudate.   Eyes:    Pupils are equal, round, and reactive to light.   Neck:    Normal range  of motion. Neck supple.      No tracheal deviation present. No thyromegaly present.   Cardiovascular:  Normal rate, regular rhythm, no murmur   Pulmonary/Chest: Breath sounds are clear and equal without wheezes or crackles.  Abdominal:   Soft. Bowel sounds are normal. Exhibits no distension and      no mass. There is no tenderness.      There is no rebound and no guarding.   Musculoskeletal:  Exhibits no edema.   Lymphadenopathy:  No cervical adenopathy.   Neurological:   Alert and oriented to person, place, and time.   Skin:    Skin is warm and dry. No rash noted. No pallor.     Emergency Department Course     Emergency Department Course & Assessments:     Interventions:  Medications   acetaminophen (TYLENOL) tablet 650 mg (has no administration in time range)   amoxicillin-clavulanate (AUGMENTIN) 875-125 MG per tablet 1 tablet (has no administration in time range)      Independent Interpretation (X-rays, CTs, rhythm strip):  None    Assessments/Consultations/Discussion of Management or Tests:  ED Course as of 05/13/23 0759   Sat May 13, 2023   0747 I obtained history and examined the patient as noted above.      Social Determinants of Health affecting care:   None    Disposition:  The patient was discharged to home.     Impression & Plan      Medical Decision Making:  *  Jairo Whittington is a 47 year old male who presents with jaw pain.  This appears to be secondary to a dental issue.  I feel that his symptoms are likely due to a periapical dental infection.  He was told to follow-up with his dentist on Monday since he will likely need a root canal.  The differential diagnosis includes: cracked tooth syndrome, pulpitis, sub-apical abscess, facial cellulitis, alveolitis amongst others. There is no evidence of deep space infections, significant facial swelling, Lemierre's Syndrome or Alejandro's angina. There are no posterior pharyngeal space (RPA, PTA) infections detected. Follow up with a dentist/endodontist in the coming  days is indicated for further work up and treatment.  Will start pain medication and antibiotics.      Diagnosis:    ICD-10-CM    1. Dental abscess  K04.7          Discharge Medications:  New Prescriptions    AMOXICILLIN-CLAVULANATE (AUGMENTIN) 875-125 MG TABLET    Take 1 tablet by mouth 2 times daily    OXYCODONE (ROXICODONE) 5 MG TABLET    Take 1 tablet (5 mg) by mouth every 4 hours as needed for pain No driving a car or drinking alcohol for 6 hours after taking this medication.      Scribe Disclosure:  Kailee SANCHES, am serving as a scribe at 7:59 AM on 5/13/2023 to document services personally performed by Georgia Mims MD based on my observations and the provider's statements to me.     5/13/2023   Georgia Mims MD [place], Cheri Lee, MD  05/13/23 3547

## 2023-05-13 NOTE — ED TRIAGE NOTES
Patient presents to the ED reporting uncontrolled dental pain. States had a filling done on a right upper tooth 2 days ago. Has been on pain medications and antibiotics with no relief in symptoms.

## 2023-05-13 NOTE — DISCHARGE INSTRUCTIONS
Continue taking Aleve or ibuprofen as needed.    You can also take Tylenol 650 mg every 4 hours as needed for pain    Opioid Medication Information    You have been given a prescription for an opioid (narcotic) pain medicine and/or have received a pain medicine while here in the Emergency Department. These medicines can make you drowsy or impaired. You must not drive, operate dangerous equipment, or engage in any other dangerous activities while taking these medications. If you drive while taking these medications, you could be arrested for DUI, or driving under the influence. Do not drink any alcohol while you are taking these medications.   Opioid pain medications can cause addiction. If you have a history of chemical dependency of any type, you are at a higher risk of becoming addicted to pain medications.  Only take these prescribed medications to treat your pain when all other options have been tried. Take it for as short a time and as few doses as possible. Store your pain pills in a secure place, as they are frequently stolen and provide a dangerous opportunity for children or visitors in your house to start abusing these powerful medications. We will not replace any lost or stolen medicine.  As soon as your pain is better, you should flush all your remaining medication.   Many prescription pain medications contain Tylenol  (acetaminophen), including Vicodin , Tylenol #3 , Norco , Lortab , and Percocet .  You should not take any extra pills of Tylenol  if you are using these prescription medications or you can get very sick.  Do not ever take more than 4000 mg of acetaminophen in any 24 hour period.  All opioids tend to cause constipation. Drink plenty of water and eat foods that have a lot of fiber, such as fruits, vegetables, prune juice, apple juice and high fiber cereal.  Take a laxative if you don t move your bowels at least every other day. Miralax , Milk of Magnesia, Colace , or Senna  can be used to  keep you regular.

## 2025-05-13 ENCOUNTER — OFFICE VISIT (OUTPATIENT)
Dept: URGENT CARE | Facility: URGENT CARE | Age: 50
End: 2025-05-13

## 2025-05-13 VITALS
SYSTOLIC BLOOD PRESSURE: 194 MMHG | HEIGHT: 73 IN | DIASTOLIC BLOOD PRESSURE: 129 MMHG | WEIGHT: 210 LBS | HEART RATE: 67 BPM | RESPIRATION RATE: 18 BRPM | OXYGEN SATURATION: 97 % | BODY MASS INDEX: 27.83 KG/M2 | TEMPERATURE: 98 F

## 2025-05-13 DIAGNOSIS — M54.2 NECK PAIN: ICD-10-CM

## 2025-05-13 DIAGNOSIS — M25.552 HIP PAIN, LEFT: ICD-10-CM

## 2025-05-13 DIAGNOSIS — M79.604 BILATERAL LEG PAIN: ICD-10-CM

## 2025-05-13 DIAGNOSIS — M79.605 BILATERAL LEG PAIN: ICD-10-CM
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
## 2025-05-13 DIAGNOSIS — I10 SEVERE HYPERTENSION: ICD-10-CM

## 2025-05-13 DIAGNOSIS — M25.551 HIP PAIN, RIGHT: ICD-10-CM

## 2025-05-13 DIAGNOSIS — M25.519 SHOULDER PAIN, UNSPECIFIED CHRONICITY, UNSPECIFIED LATERALITY: ICD-10-CM

## 2025-05-13 DIAGNOSIS — R51.9 SEVERE HEADACHE: ICD-10-CM

## 2025-05-13 DIAGNOSIS — M54.9 SEVERE BACK PAIN: Primary | ICD-10-CM

## 2025-05-13 DIAGNOSIS — H93.12 TINNITUS, LEFT: ICD-10-CM

## 2025-05-13 PROCEDURE — 99205 OFFICE O/P NEW HI 60 MIN: CPT | Performed by: NURSE PRACTITIONER

## 2025-05-13 PROCEDURE — 3077F SYST BP >= 140 MM HG: CPT | Performed by: NURSE PRACTITIONER

## 2025-05-13 PROCEDURE — 3080F DIAST BP >= 90 MM HG: CPT | Performed by: NURSE PRACTITIONER

## 2025-05-14 ENCOUNTER — HOSPITAL ENCOUNTER (EMERGENCY)
Facility: CLINIC | Age: 50
Discharge: HOME | End: 2025-05-14
Attending: EMERGENCY MEDICINE | Admitting: EMERGENCY MEDICINE

## 2025-05-14 VITALS
HEIGHT: 72 IN | BODY MASS INDEX: 28.25 KG/M2 | RESPIRATION RATE: 18 BRPM | WEIGHT: 208.56 LBS | HEART RATE: 80 BPM | SYSTOLIC BLOOD PRESSURE: 182 MMHG | TEMPERATURE: 98 F | OXYGEN SATURATION: 98 % | DIASTOLIC BLOOD PRESSURE: 99 MMHG
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
## 2025-05-14 DIAGNOSIS — M79.10 MYALGIA: ICD-10-CM

## 2025-05-14 DIAGNOSIS — S39.012A BACK STRAIN, INITIAL ENCOUNTER: ICD-10-CM

## 2025-05-14 DIAGNOSIS — S16.1XXA STRAIN OF NECK MUSCLE, INITIAL ENCOUNTER: ICD-10-CM

## 2025-05-14 DIAGNOSIS — I10 UNCONTROLLED HYPERTENSION: ICD-10-CM

## 2025-05-14 LAB
ALBUMIN SERPL BCG-MCNC: 4.5 G/DL (ref 3.5–5.2)
ALP SERPL-CCNC: 119 U/L (ref 40–150)
ALT SERPL W P-5'-P-CCNC: 27 U/L (ref 0–70)
ANION GAP SERPL CALCULATED.3IONS-SCNC: 12 MMOL/L (ref 7–15)
AST SERPL W P-5'-P-CCNC: 24 U/L (ref 0–45)
BASOPHILS # BLD AUTO: 0 10E3/UL (ref 0–0.2)
BASOPHILS NFR BLD AUTO: 1 %
BILIRUB DIRECT SERPL-MCNC: 0.09 MG/DL (ref 0–0.3)
BILIRUB SERPL-MCNC: 0.4 MG/DL
BUN SERPL-MCNC: 11.3 MG/DL (ref 6–20)
CALCIUM SERPL-MCNC: 9.4 MG/DL (ref 8.8–10.4)
CHLORIDE SERPL-SCNC: 100 MMOL/L (ref 98–107)
CREAT SERPL-MCNC: 1.01 MG/DL (ref 0.67–1.17)
EGFRCR SERPLBLD CKD-EPI 2021: >90 ML/MIN/1.73M2
EOSINOPHIL # BLD AUTO: 0.1 10E3/UL (ref 0–0.7)
EOSINOPHIL NFR BLD AUTO: 1 %
ERYTHROCYTE [DISTWIDTH] IN BLOOD BY AUTOMATED COUNT: 14.6 % (ref 10–15)
GLUCOSE SERPL-MCNC: 83 MG/DL (ref 70–99)
HCO3 SERPL-SCNC: 26 MMOL/L (ref 22–29)
HCT VFR BLD AUTO: 43.5 % (ref 40–53)
HGB BLD-MCNC: 14.7 G/DL (ref 13.3–17.7)
IMM GRANULOCYTES # BLD: 0 10E3/UL
IMM GRANULOCYTES NFR BLD: 0 %
LYMPHOCYTES # BLD AUTO: 2.3 10E3/UL (ref 0.8–5.3)
LYMPHOCYTES NFR BLD AUTO: 37 %
MCH RBC QN AUTO: 26.7 PG (ref 26.5–33)
MCHC RBC AUTO-ENTMCNC: 33.8 G/DL (ref 31.5–36.5)
MCV RBC AUTO: 79 FL (ref 78–100)
MONOCYTES # BLD AUTO: 0.7 10E3/UL (ref 0–1.3)
MONOCYTES NFR BLD AUTO: 12 %
NEUTROPHILS # BLD AUTO: 3.1 10E3/UL (ref 1.6–8.3)
NEUTROPHILS NFR BLD AUTO: 49 %
NRBC # BLD AUTO: 0 10E3/UL
NRBC BLD AUTO-RTO: 0 /100
PLATELET # BLD AUTO: 341 10E3/UL (ref 150–450)
POTASSIUM SERPL-SCNC: 4 MMOL/L (ref 3.4–5.3)
PROT SERPL-MCNC: 7.7 G/DL (ref 6.4–8.3)
RBC # BLD AUTO: 5.51 10E6/UL (ref 4.4–5.9)
SODIUM SERPL-SCNC: 138 MMOL/L (ref 135–145)
WBC # BLD AUTO: 6.3 10E3/UL (ref 4–11)

## 2025-05-14 PROCEDURE — 99285 EMERGENCY DEPT VISIT HI MDM: CPT | Mod: 25

## 2025-05-14 PROCEDURE — 36415 COLL VENOUS BLD VENIPUNCTURE: CPT | Performed by: EMERGENCY MEDICINE

## 2025-05-14 PROCEDURE — 84460 ALANINE AMINO (ALT) (SGPT): CPT | Performed by: EMERGENCY MEDICINE

## 2025-05-14 PROCEDURE — 82565 ASSAY OF CREATININE: CPT | Performed by: EMERGENCY MEDICINE

## 2025-05-14 PROCEDURE — 85025 COMPLETE CBC W/AUTO DIFF WBC: CPT | Performed by: EMERGENCY MEDICINE

## 2025-05-14 PROCEDURE — 96372 THER/PROPH/DIAG INJ SC/IM: CPT | Performed by: EMERGENCY MEDICINE

## 2025-05-14 PROCEDURE — 250N000013 HC RX MED GY IP 250 OP 250 PS 637: Performed by: EMERGENCY MEDICINE

## 2025-05-14 PROCEDURE — 93005 ELECTROCARDIOGRAM TRACING: CPT

## 2025-05-14 PROCEDURE — 250N000011 HC RX IP 250 OP 636: Mod: JZ | Performed by: EMERGENCY MEDICINE

## 2025-05-14 RX ORDER — KETOROLAC TROMETHAMINE 15 MG/ML
15 INJECTION, SOLUTION INTRAMUSCULAR; INTRAVENOUS ONCE
Status: COMPLETED | OUTPATIENT
Start: 2025-05-14 | End: 2025-05-14

## 2025-05-14 RX ORDER — CYCLOBENZAPRINE HCL 10 MG
10 TABLET ORAL 3 TIMES DAILY PRN
Qty: 20 TABLET | Refills: 0 | Status: SHIPPED | OUTPATIENT
Start: 2025-05-14

## 2025-05-14 RX ORDER — CYCLOBENZAPRINE HCL 10 MG
10 TABLET ORAL ONCE
Status: COMPLETED | OUTPATIENT
Start: 2025-05-14 | End: 2025-05-14

## 2025-05-14 RX ORDER — LISINOPRIL AND HYDROCHLOROTHIAZIDE 20; 25 MG/1; MG/1
1 TABLET ORAL ONCE
Status: COMPLETED | OUTPATIENT
Start: 2025-05-14 | End: 2025-05-14

## 2025-05-14 RX ORDER — KETOROLAC TROMETHAMINE 15 MG/ML
15 INJECTION, SOLUTION INTRAMUSCULAR; INTRAVENOUS ONCE
Status: DISCONTINUED | OUTPATIENT
Start: 2025-05-14 | End: 2025-05-14

## 2025-05-14 RX ADMIN — LISINOPRIL AND HYDROCHLOROTHIAZIDE TABLETS 1 TABLET: 20; 25 TABLET ORAL at 18:25

## 2025-05-14 RX ADMIN — KETOROLAC TROMETHAMINE 15 MG: 15 INJECTION, SOLUTION INTRAMUSCULAR; INTRAVENOUS at 16:31

## 2025-05-14 RX ADMIN — CYCLOBENZAPRINE 10 MG: 10 TABLET, FILM COATED ORAL at 16:28

## 2025-05-14 ASSESSMENT — COLUMBIA-SUICIDE SEVERITY RATING SCALE - C-SSRS
2. HAVE YOU ACTUALLY HAD ANY THOUGHTS OF KILLING YOURSELF IN THE PAST MONTH?: NO
1. IN THE PAST MONTH, HAVE YOU WISHED YOU WERE DEAD OR WISHED YOU COULD GO TO SLEEP AND NOT WAKE UP?: NO
6. HAVE YOU EVER DONE ANYTHING, STARTED TO DO ANYTHING, OR PREPARED TO DO ANYTHING TO END YOUR LIFE?: NO

## 2025-05-14 ASSESSMENT — ACTIVITIES OF DAILY LIVING (ADL)
ADLS_ACUITY_SCORE: 41
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
## 2025-05-14 NOTE — DISCHARGE INSTRUCTIONS
Please follow-up with your primary care provider and/or specialist regarding your visit to the ER today.    Please return to the emergency department should you experience any of the symptoms we specifically discussed, including but not limited to recurrence or worsening of your symptoms, or development of any new and concerning symptoms such as worsening headache, neck pain, new numbness or weakness, new chest pain or abdominal pain, or any new concerns.    Please take medication as instructed on bottle.    Please make sure you are taking your blood pressure medications and please follow-up with your primary care doctor regarding further blood pressure management.

## 2025-05-15 LAB
ATRIAL RATE - MUSE: 72 BPM
DIASTOLIC BLOOD PRESSURE - MUSE: NORMAL MMHG
INTERPRETATION ECG - MUSE: NORMAL
P AXIS - MUSE: 17 DEGREES
PR INTERVAL - MUSE: 138 MS
QRS DURATION - MUSE: 94 MS
QT - MUSE: 380 MS
QTC - MUSE: 416 MS
R AXIS - MUSE: -1 DEGREES
SYSTOLIC BLOOD PRESSURE - MUSE: NORMAL MMHG
T AXIS - MUSE: 18 DEGREES
VENTRICULAR RATE- MUSE: 72 BPM

## (undated) RX ORDER — IBUPROFEN 800 MG/1
TABLET, FILM COATED ORAL
Status: DISPENSED
Start: 2017-03-28